# Patient Record
Sex: FEMALE | Race: WHITE | NOT HISPANIC OR LATINO | Employment: PART TIME | URBAN - METROPOLITAN AREA
[De-identification: names, ages, dates, MRNs, and addresses within clinical notes are randomized per-mention and may not be internally consistent; named-entity substitution may affect disease eponyms.]

---

## 2017-04-12 ENCOUNTER — GENERIC CONVERSION - ENCOUNTER (OUTPATIENT)
Dept: OTHER | Facility: OTHER | Age: 32
End: 2017-04-12

## 2019-09-06 ENCOUNTER — OFFICE VISIT (OUTPATIENT)
Dept: URGENT CARE | Facility: MEDICAL CENTER | Age: 34
End: 2019-09-06
Payer: COMMERCIAL

## 2019-09-06 VITALS
TEMPERATURE: 97 F | DIASTOLIC BLOOD PRESSURE: 80 MMHG | SYSTOLIC BLOOD PRESSURE: 108 MMHG | RESPIRATION RATE: 18 BRPM | WEIGHT: 119.05 LBS | BODY MASS INDEX: 20.32 KG/M2 | OXYGEN SATURATION: 100 % | HEIGHT: 64 IN | HEART RATE: 67 BPM

## 2019-09-06 DIAGNOSIS — J30.9 ALLERGIC RHINITIS, UNSPECIFIED SEASONALITY, UNSPECIFIED TRIGGER: Primary | ICD-10-CM

## 2019-09-06 PROCEDURE — G0381 LEV 2 HOSP TYPE B ED VISIT: HCPCS | Performed by: PHYSICIAN ASSISTANT

## 2019-09-06 RX ORDER — PREDNISONE 10 MG/1
TABLET ORAL
Qty: 18 TABLET | Refills: 0 | Status: SHIPPED | OUTPATIENT
Start: 2019-09-06 | End: 2020-02-24 | Stop reason: ALTCHOICE

## 2019-09-06 NOTE — PROGRESS NOTES
[facility]        NAME: Marsha Coles is a 29 y o  female  : 1985    MRN: [id number]  DATE: 2019  TIME: 2:34 PM    Assessment and Plan   Allergic rhinitis, unspecified seasonality, unspecified trigger [J30 9]  1  Allergic rhinitis, unspecified seasonality, unspecified trigger  predniSONE 10 mg tablet         Patient Instructions       Follow up with PCP in 3-5 days  Proceed to  ER if symptoms worsen  Chief Complaint     Chief Complaint   Patient presents with    Cold Like Symptoms     c/o three week hx sinus pain, cough, nasal congestion, achiness         History of Present Illness       URI    This is a new problem  The current episode started 1 to 4 weeks ago  The problem has been gradually worsening  There has been no fever  Associated symptoms include congestion, coughing ( nonproductive), a plugged ear sensation, rhinorrhea and sinus pain  Pertinent negatives include no abdominal pain, chest pain, diarrhea, dysuria, ear pain, headaches, joint pain, joint swelling, nausea, neck pain, rash, sneezing, sore throat, swollen glands, vomiting or wheezing  She has tried decongestant for the symptoms  The treatment provided no relief  Review of Systems   Review of Systems   HENT: Positive for congestion, rhinorrhea and sinus pain  Negative for ear pain, sneezing and sore throat  Respiratory: Positive for cough ( nonproductive)  Negative for wheezing  Cardiovascular: Negative for chest pain  Gastrointestinal: Negative for abdominal pain, diarrhea, nausea and vomiting  Genitourinary: Negative for dysuria  Musculoskeletal: Negative for joint pain and neck pain  Skin: Negative for rash  Neurological: Negative for headaches  All other systems reviewed and are negative          Current Medications       Current Outpatient Medications:     predniSONE 10 mg tablet, 4 x 3 days, 3 x 1, 2 x 1, 1 x 1, Disp: 18 tablet, Rfl: 0    Current Allergies     Allergies as of 09/06/2019 - Reviewed 09/06/2019   Allergen Reaction Noted    Codeine Anaphylaxis 09/06/2019            The following portions of the patient's history were reviewed and updated as appropriate: allergies, current medications, past family history, past medical history, past social history, past surgical history and problem list      Past Medical History:   Diagnosis Date    Known health problems: none        Past Surgical History:   Procedure Laterality Date    ELBOW SURGERY      FINGER SURGERY         History reviewed  No pertinent family history  Medications have been verified  Objective   /80   Pulse 67   Temp (!) 97 °F (36 1 °C) (Tympanic)   Resp 18   Ht 5' 4" (1 626 m)   Wt 54 kg (119 lb 0 8 oz)   SpO2 100%   BMI 20 43 kg/m²        Physical Exam     Physical Exam   Constitutional: She appears well-developed and well-nourished  HENT:   Right Ear: Hearing, external ear and ear canal normal  Tympanic membrane is bulging  Left Ear: Hearing, external ear and ear canal normal  Tympanic membrane is bulging  Nose: Mucosal edema and rhinorrhea present  Mouth/Throat: Posterior oropharyngeal erythema present  Cardiovascular: Normal rate, regular rhythm and normal heart sounds  Pulmonary/Chest: Effort normal and breath sounds normal    Nursing note and vitals reviewed

## 2020-02-24 ENCOUNTER — OFFICE VISIT (OUTPATIENT)
Dept: FAMILY MEDICINE CLINIC | Facility: CLINIC | Age: 35
End: 2020-02-24
Payer: COMMERCIAL

## 2020-02-24 VITALS
BODY MASS INDEX: 20.86 KG/M2 | WEIGHT: 122.2 LBS | SYSTOLIC BLOOD PRESSURE: 118 MMHG | DIASTOLIC BLOOD PRESSURE: 78 MMHG | RESPIRATION RATE: 18 BRPM | OXYGEN SATURATION: 98 % | HEIGHT: 64 IN | TEMPERATURE: 97.9 F | HEART RATE: 90 BPM

## 2020-02-24 DIAGNOSIS — F32.A ANXIETY AND DEPRESSION: Primary | ICD-10-CM

## 2020-02-24 DIAGNOSIS — Z72.0 TOBACCO ABUSE: ICD-10-CM

## 2020-02-24 DIAGNOSIS — F41.9 ANXIETY AND DEPRESSION: Primary | ICD-10-CM

## 2020-02-24 PROCEDURE — 3008F BODY MASS INDEX DOCD: CPT | Performed by: INTERNAL MEDICINE

## 2020-02-24 PROCEDURE — 99203 OFFICE O/P NEW LOW 30 MIN: CPT | Performed by: INTERNAL MEDICINE

## 2020-02-24 RX ORDER — ESCITALOPRAM OXALATE 5 MG/1
5 TABLET ORAL DAILY
Qty: 30 TABLET | Refills: 2 | Status: SHIPPED | OUTPATIENT
Start: 2020-02-24 | End: 2020-04-15 | Stop reason: ALTCHOICE

## 2020-02-24 RX ORDER — HYDROXYZINE HYDROCHLORIDE 25 MG/1
25 TABLET, FILM COATED ORAL EVERY 8 HOURS PRN
COMMUNITY
Start: 2020-02-07 | End: 2020-05-19 | Stop reason: ALTCHOICE

## 2020-02-24 NOTE — PROGRESS NOTES
Assessment/Plan:     1  Anxiety and depression  Assessment & Plan:  Discussed in detail  I have given her a referral to a therapist and also will start Lexapro 5 mg daily  Discussed to watch for SE and if she notes any or has to stop the medication to let us know  She will follow up in 2 months  Orders:  -     TSH, 3rd generation with Free T4 reflex  -     escitalopram (LEXAPRO) 5 mg tablet; Take 1 tablet (5 mg total) by mouth daily    2  Tobacco abuse  Assessment & Plan:  Discussed and has been cutting down  Encouraged to quit smoking  Subjective:      Patient ID: Juhi Raya is a 29 y o  female  Bautista Salgado is here today to establish care  Chart was reviewed and updated  Reports largely feeling okay but has been noticing increased anxiety over the last year or so  She reports having a panic attack and was seen in the ED as well for this  She was given hydroxyzine and felt lightheaded from it  She does not like to take medications in general but feels that she needs something to try and help  The following portions of the patient's history were reviewed and updated as appropriate: She  has a past medical history of Known health problems: none  She   Patient Active Problem List    Diagnosis Date Noted    Tobacco abuse 02/24/2020    Anxiety and depression 02/24/2020     She  has a past surgical history that includes Elbow surgery and Finger surgery  Her family history is not on file  She  reports that she has been smoking cigarettes  She has been smoking about 0 50 packs per day  She has never used smokeless tobacco  She reports that she drinks about 14 0 standard drinks of alcohol per week  She reports that she does not use drugs    Current Outpatient Medications   Medication Sig Dispense Refill    hydrOXYzine HCL (ATARAX) 25 mg tablet Take 25 mg by mouth every 8 (eight) hours as needed      escitalopram (LEXAPRO) 5 mg tablet Take 1 tablet (5 mg total) by mouth daily 30 tablet 2 No current facility-administered medications for this visit  She is allergic to codeine       Review of Systems   Constitutional: Positive for appetite change  Negative for chills, fatigue and fever  Respiratory: Negative for cough, shortness of breath and wheezing  Cardiovascular: Negative for chest pain, palpitations and leg swelling  Gastrointestinal: Negative for abdominal pain, nausea and vomiting  Neurological: Negative for light-headedness and headaches  Psychiatric/Behavioral: Positive for dysphoric mood and sleep disturbance  The patient is nervous/anxious  Objective:      /78   Pulse 90   Temp 97 9 °F (36 6 °C)   Resp 18   Ht 5' 4" (1 626 m)   Wt 55 4 kg (122 lb 3 2 oz)   SpO2 98%   BMI 20 98 kg/m²          Physical Exam   Constitutional: She is oriented to person, place, and time  She appears well-developed and well-nourished  No distress  HENT:   Head: Normocephalic and atraumatic  Mouth/Throat: Oropharynx is clear and moist    Eyes: Pupils are equal, round, and reactive to light  Conjunctivae and EOM are normal  Right eye exhibits no discharge  Left eye exhibits no discharge  Neck: Normal range of motion  Neck supple  No thyromegaly present  Cardiovascular: Normal rate, regular rhythm and normal heart sounds  Pulmonary/Chest: Effort normal and breath sounds normal  No respiratory distress  She has no wheezes  Abdominal: Soft  Bowel sounds are normal  There is no tenderness  Musculoskeletal: Normal range of motion  She exhibits no edema  Lymphadenopathy:     She has no cervical adenopathy  Neurological: She is alert and oriented to person, place, and time  Skin: Skin is warm and dry  She is not diaphoretic  Psychiatric: She has a normal mood and affect  Her speech is normal and behavior is normal  Judgment and thought content normal    Vitals reviewed

## 2020-02-24 NOTE — ASSESSMENT & PLAN NOTE
Discussed in detail  I have given her a referral to a therapist and also will start Lexapro 5 mg daily  Discussed to watch for SE and if she notes any or has to stop the medication to let us know  She will follow up in 2 months

## 2020-03-23 ENCOUNTER — TELEPHONE (OUTPATIENT)
Dept: ADMINISTRATIVE | Facility: OTHER | Age: 35
End: 2020-03-23

## 2020-03-23 NOTE — TELEPHONE ENCOUNTER
Upon review of the In Basket request we were able to locate, review, and update the patient chart as requested for Pap Smear (HPV) aka Cervical Cancer Screening  Any additional questions or concerns should be emailed to the Practice Liaisons via [email or web address] email, please do not reply via In Basket      Thank you  Jennifer Wisdom

## 2020-03-23 NOTE — TELEPHONE ENCOUNTER
----- Message from Jesus Muhammad sent at 3/23/2020  9:54 AM EDT -----  Regarding: PAP exam/ [facility]  03/23/20 9:54 AM    Hello, our patient Modesta Moya has had Pap Smear (HPV) aka Cervical Cancer Screening completed/performed  Please assist in updating the patient chart by pulling the Care Everywhere (CE) document  The date of service is 1/10/2018       Thank you,  Miya Patino MA  [facility]

## 2020-04-15 ENCOUNTER — OFFICE VISIT (OUTPATIENT)
Dept: FAMILY MEDICINE CLINIC | Facility: CLINIC | Age: 35
End: 2020-04-15
Payer: COMMERCIAL

## 2020-04-15 VITALS
OXYGEN SATURATION: 98 % | HEART RATE: 90 BPM | RESPIRATION RATE: 18 BRPM | SYSTOLIC BLOOD PRESSURE: 120 MMHG | HEIGHT: 64 IN | BODY MASS INDEX: 20.45 KG/M2 | WEIGHT: 119.8 LBS | TEMPERATURE: 98.2 F | DIASTOLIC BLOOD PRESSURE: 82 MMHG

## 2020-04-15 DIAGNOSIS — F32.A ANXIETY AND DEPRESSION: Primary | ICD-10-CM

## 2020-04-15 DIAGNOSIS — F41.9 ANXIETY AND DEPRESSION: Primary | ICD-10-CM

## 2020-04-15 PROCEDURE — 3008F BODY MASS INDEX DOCD: CPT | Performed by: INTERNAL MEDICINE

## 2020-04-15 PROCEDURE — 99213 OFFICE O/P EST LOW 20 MIN: CPT | Performed by: INTERNAL MEDICINE

## 2020-04-15 RX ORDER — BUSPIRONE HYDROCHLORIDE 5 MG/1
5 TABLET ORAL 2 TIMES DAILY
Qty: 60 TABLET | Refills: 5 | Status: SHIPPED | OUTPATIENT
Start: 2020-04-15 | End: 2020-05-19 | Stop reason: ALTCHOICE

## 2020-04-15 RX ORDER — HYDROXYZINE HYDROCHLORIDE 10 MG/1
10 TABLET, FILM COATED ORAL EVERY 6 HOURS PRN
Qty: 30 TABLET | Refills: 0 | Status: SHIPPED | OUTPATIENT
Start: 2020-04-15 | End: 2020-11-17 | Stop reason: ALTCHOICE

## 2020-05-19 ENCOUNTER — OFFICE VISIT (OUTPATIENT)
Dept: FAMILY MEDICINE CLINIC | Facility: CLINIC | Age: 35
End: 2020-05-19
Payer: COMMERCIAL

## 2020-05-19 VITALS
DIASTOLIC BLOOD PRESSURE: 84 MMHG | HEIGHT: 64 IN | BODY MASS INDEX: 20.51 KG/M2 | SYSTOLIC BLOOD PRESSURE: 122 MMHG | TEMPERATURE: 97.8 F | WEIGHT: 120.1 LBS | HEART RATE: 100 BPM | OXYGEN SATURATION: 98 % | RESPIRATION RATE: 18 BRPM

## 2020-05-19 DIAGNOSIS — F41.9 ANXIETY AND DEPRESSION: Primary | ICD-10-CM

## 2020-05-19 DIAGNOSIS — G56.22 CUBITAL TUNNEL SYNDROME ON LEFT: ICD-10-CM

## 2020-05-19 DIAGNOSIS — F32.A ANXIETY AND DEPRESSION: Primary | ICD-10-CM

## 2020-05-19 PROCEDURE — 3008F BODY MASS INDEX DOCD: CPT | Performed by: INTERNAL MEDICINE

## 2020-05-19 PROCEDURE — 99213 OFFICE O/P EST LOW 20 MIN: CPT | Performed by: INTERNAL MEDICINE

## 2020-11-17 ENCOUNTER — TELEMEDICINE (OUTPATIENT)
Dept: FAMILY MEDICINE CLINIC | Facility: CLINIC | Age: 35
End: 2020-11-17
Payer: COMMERCIAL

## 2020-11-17 DIAGNOSIS — Z20.822 EXPOSURE TO COVID-19 VIRUS: Primary | ICD-10-CM

## 2020-11-17 PROCEDURE — 99213 OFFICE O/P EST LOW 20 MIN: CPT | Performed by: INTERNAL MEDICINE

## 2020-11-23 DIAGNOSIS — B34.9 VIRAL INFECTION, UNSPECIFIED: ICD-10-CM

## 2020-11-23 DIAGNOSIS — Z20.822 EXPOSURE TO COVID-19 VIRUS: ICD-10-CM

## 2020-11-23 PROCEDURE — 87637 SARSCOV2&INF A&B&RSV AMP PRB: CPT | Performed by: INTERNAL MEDICINE

## 2020-11-25 LAB
FLUAV RNA NPH QL NAA+PROBE: NOT DETECTED
FLUBV RNA NPH QL NAA+PROBE: NOT DETECTED
RSV RNA NPH QL NAA+PROBE: NOT DETECTED
SARS-COV-2 RNA NPH QL NAA+PROBE: NOT DETECTED

## 2021-01-04 ENCOUNTER — OFFICE VISIT (OUTPATIENT)
Dept: FAMILY MEDICINE CLINIC | Facility: CLINIC | Age: 36
End: 2021-01-04
Payer: COMMERCIAL

## 2021-01-04 VITALS
OXYGEN SATURATION: 99 % | HEIGHT: 64 IN | BODY MASS INDEX: 20.73 KG/M2 | SYSTOLIC BLOOD PRESSURE: 122 MMHG | WEIGHT: 121.4 LBS | TEMPERATURE: 97.8 F | HEART RATE: 81 BPM | DIASTOLIC BLOOD PRESSURE: 78 MMHG

## 2021-01-04 DIAGNOSIS — G89.29 CHRONIC BILATERAL LOW BACK PAIN WITHOUT SCIATICA: ICD-10-CM

## 2021-01-04 DIAGNOSIS — F41.9 ANXIETY AND DEPRESSION: Primary | ICD-10-CM

## 2021-01-04 DIAGNOSIS — M79.602 LEFT ARM PAIN: ICD-10-CM

## 2021-01-04 DIAGNOSIS — F32.A ANXIETY AND DEPRESSION: Primary | ICD-10-CM

## 2021-01-04 DIAGNOSIS — Z72.0 TOBACCO ABUSE: ICD-10-CM

## 2021-01-04 DIAGNOSIS — M25.522 LEFT ELBOW PAIN: ICD-10-CM

## 2021-01-04 DIAGNOSIS — M54.50 CHRONIC BILATERAL LOW BACK PAIN WITHOUT SCIATICA: ICD-10-CM

## 2021-01-04 PROCEDURE — 99214 OFFICE O/P EST MOD 30 MIN: CPT | Performed by: PHYSICIAN ASSISTANT

## 2021-01-04 RX ORDER — PAROXETINE 10 MG/1
10 TABLET, FILM COATED ORAL DAILY
Qty: 30 TABLET | Refills: 1 | Status: SHIPPED | OUTPATIENT
Start: 2021-01-04

## 2021-01-04 NOTE — PROGRESS NOTES
FAMILY PRACTICE OFFICE VISIT  [facility]       NAME: Edgardo Sousa  AGE: 28 y o  SEX: female       : 1985        MRN: [id number]    DATE: 2021  TIME: 1:17 AM    Assessment and Plan     Problem List Items Addressed This Visit        Other    Tobacco abuse     Encouraged to quit smoking but is not currently ready  States that she will likely quit when she has her next pregnancy  Encouraged to reach out if she desires any assistance  Tobacco Cessation Counseling: Tobacco cessation counseling and education was provided  The patient is sincerely urged to quit consumption of tobacco  She is not ready to quit tobacco  The numerous health risks of tobacco consumption were discussed  If she decides to quit, there are a number of helpful adjunctive aids, and she can see me to discuss nicotine replacement therapy, chantix, or bupropion anytime in the future  Anxiety and depression - Primary     Patient is interested in taking a daily medication anxiety and depression  She was started Paxil 10 daily  She can continue with hydroxyzine occasionally as needed  Follow-up in 1 month  Call if problems or concerns in the interim  Depression Screening Follow-up Plan: Patient's depression screening was positive with a PHQ-2 score of 2  Their PHQ-9 score was 7  Patient assessed for underlying major depression  They have no active suicidal ideations  Brief counseling provided and recommend additional follow-up/re-evaluation next office visit  Relevant Medications    PARoxetine (PAXIL) 10 mg tablet    Left elbow pain     With history of fracture about 10 years ago  Check x-ray of left elbow since expresses point tenderness just proximal to the olecranon process  Relevant Orders    XR elbow 3+ vw left    Left arm pain     Possibly radicular in nature  Check x-ray of cervical spine and left elbow    Consider EMG if noncontributory  Reassess at next visit  Relevant Orders    XR spine cervical complete 4 or 5 vw non injury    Chronic bilateral low back pain without sciatica     Reviewed that today's exam suggests muscular source of her symptoms  She was given referral for physical therapy  Encouraged continue trying heating pad as needed since that gives her the most relief  Reassess at next visit  Relevant Orders    Ambulatory referral to Physical Therapy          Anxiety and depression  Patient is interested in taking a daily medication anxiety and depression  She was started Paxil 10 daily  She can continue with hydroxyzine occasionally as needed  Follow-up in 1 month  Call if problems or concerns in the interim  Depression Screening Follow-up Plan: Patient's depression screening was positive with a PHQ-2 score of 2  Their PHQ-9 score was 7  Patient assessed for underlying major depression  They have no active suicidal ideations  Brief counseling provided and recommend additional follow-up/re-evaluation next office visit  Chronic bilateral low back pain without sciatica  Reviewed that today's exam suggests muscular source of her symptoms  She was given referral for physical therapy  Encouraged continue trying heating pad as needed since that gives her the most relief  Reassess at next visit  Left arm pain  Possibly radicular in nature  Check x-ray of cervical spine and left elbow  Consider EMG if noncontributory  Reassess at next visit  Left elbow pain  With history of fracture about 10 years ago  Check x-ray of left elbow since expresses point tenderness just proximal to the olecranon process  Tobacco abuse  Encouraged to quit smoking but is not currently ready  States that she will likely quit when she has her next pregnancy  Encouraged to reach out if she desires any assistance  Tobacco Cessation Counseling: Tobacco cessation counseling and education was provided   The patient is sincerely urged to quit consumption of tobacco  She is not ready to quit tobacco  The numerous health risks of tobacco consumption were discussed  If she decides to quit, there are a number of helpful adjunctive aids, and she can see me to discuss nicotine replacement therapy, chantix, or bupropion anytime in the future  Chief Complaint     Chief Complaint   Patient presents with    Follow-up     patient states she has been in alot of pain left arm, back  she states her left hand goes numb  History of Present Illness   Abdias Lucia is a 28y o -year-old female who presents for anxiety and hand numbness  Patient notes that she has chronic pain in the left shoulder, elbow, forearm and hand for the last few months  She broke the left elbow 10 years ago  She denies any visual swelling or color changes  She notes stabbing in the elbow and up the arm  She has burning in the forearm and left hand goes numb when it is most severe  She denies any current treatments or previous tests  She denies a change in the pain with Tylenol  She notes that she has ongoing pain in the lower back for months  She tries to work on posture but then causes shooting in her back  She notes sleeping on stomach makes it severe  She gets better relief with heating pad than Tylenol  She has occasional pain in left calf as well  She denies numbness or weakness  She has a weighted feeling in the left leg when severe  She denies incontinence  She notes that she had a terrible anxiety  She notes that she had trouble breathing while just making sandwich for her son  She notes that hydroxyzine made her dizzy  She felt like a zombie and was lethargic  Review of Systems   Review of Systems   Genitourinary:        No incontinence   Musculoskeletal: Positive for back pain  Negative for neck pain  Left arm pain   Neurological: Positive for numbness (in left hand )  Negative for weakness  Psychiatric/Behavioral: Positive for dysphoric mood  The patient is nervous/anxious  Active Problem List     Patient Active Problem List   Diagnosis    Tobacco abuse    Anxiety and depression    Left elbow pain    Left arm pain    Chronic bilateral low back pain without sciatica         Past Medical History:  Past Medical History:   Diagnosis Date    Bulimia nervosa     per [employer]    Right clavicle fracture        Past Surgical History:  Past Surgical History:   Procedure Laterality Date    ELBOW SURGERY      FINGER SURGERY Right     4th finger       Family History:  History reviewed  No pertinent family history  Social History:  Social History     Socioeconomic History    Marital status: /Civil Union     Spouse name: Not on file    Number of children: Not on file    Years of education: Not on file    Highest education level: Not on file   Occupational History    Not on file   Social Needs    Financial resource strain: Not on file    Food insecurity     Worry: Not on file     Inability: Not on file   [employer] needs     Medical: Not on file     Non-medical: Not on file   Tobacco Use    Smoking status: Current Every Day Smoker     Packs/day: 0 50     Types: Cigarettes    Smokeless tobacco: Never Used   Substance and Sexual Activity    Alcohol use:  Yes     Alcohol/week: 14 0 standard drinks     Types: 14 Glasses of wine per week     Frequency: 4 or more times a week     Drinks per session: 1 or 2     Binge frequency: Never    Drug use: Never    Sexual activity: Not on file   Lifestyle    Physical activity     Days per week: Not on file     Minutes per session: Not on file    Stress: Not on file   Relationships    Social connections     Talks on phone: Not on file     Gets together: Not on file     Attends [employer]ism service: Not on file     Active member of club or organization: Not on file     Attends meetings of clubs or organizations: Not on file     Relationship status: Not on file    Intimate partner violence     Fear of current or ex partner: Not on file     Emotionally abused: Not on file     Physically abused: Not on file     Forced sexual activity: Not on file   Other Topics Concern    Not on file   Social History Narrative    Not on file       Objective     Vitals:    01/04/21 1326   BP: 122/78   BP Location: Right arm   Patient Position: Sitting   Cuff Size: Adult   Pulse: 81   Temp: 97 8 °F (36 6 °C)   TempSrc: Temporal   SpO2: 99%   Weight: 55 1 kg (121 lb 6 4 oz)   Height: 5' 4" (1 626 m)     Wt Readings from Last 3 Encounters:   01/04/21 55 1 kg (121 lb 6 4 oz)   05/19/20 54 5 kg (120 lb 1 6 oz)   04/15/20 54 3 kg (119 lb 12 8 oz)       Physical Exam  Vitals signs reviewed  Constitutional:       General: She is not in acute distress  Appearance: Normal appearance  She is normal weight  She is not ill-appearing  Neck:      Musculoskeletal: Normal range of motion and neck supple  No neck rigidity or muscular tenderness  Cardiovascular:      Rate and Rhythm: Normal rate and regular rhythm  Pulses: Normal pulses  Heart sounds: Normal heart sounds  No murmur  Pulmonary:      Effort: Pulmonary effort is normal       Breath sounds: Normal breath sounds  No wheezing, rhonchi or rales  Musculoskeletal:         General: Tenderness (in left arm especially lateral arm and proximal to the olecranon process, also TTP over the lower lumbar spine and b/l paraspinal lumbar areas) present  No swelling or deformity  Right lower leg: No edema  Left lower leg: No edema  Comments: FROM in lumbar spine but pain with forward flexion across lumbar area b/l   Skin:     General: Skin is warm and dry  Neurological:      Mental Status: She is alert  Sensory: Sensation is intact  Deep Tendon Reflexes:      Reflex Scores:       Patellar reflexes are 3+ on the right side and 3+ on the left side       Comments: Negative seated SLR bilaterally, Equal and intact light touch in UE and LE bilaterally   Psychiatric:         Mood and Affect: Mood normal          Behavior: Behavior normal          Pertinent Laboratory/Diagnostic Studies:    Results for orders placed or performed in visit on 11/23/20   Novel Coronavirus 2019(COVID-19), Influenza A/B, RSV [facility] Collected at [facility] or [facility]    Specimen: Nose; Nares   Result Value Ref Range    SARS-COV-2 Not Detected Not Detected    INFLUENZA A PCR Not Detected Not Detected    INFLUENZA B PCR Not Detected Not Detected    RSV PCR Not Detected Not Detected         ALLERGIES:  Allergies   Allergen Reactions    Codeine Anaphylaxis       Current Medications     Current Outpatient Medications   Medication Sig Dispense Refill    PARoxetine (PAXIL) 10 mg tablet Take 1 tablet (10 mg total) by mouth daily 30 tablet 1     No current facility-administered medications for this visit            Health Maintenance     Health Maintenance   Topic Date Due    Pneumococcal Vaccine: Pediatrics (0 to 5 Years) and At-Risk Patients (6 to 59 Years) (1 of 1 - PPSV23) 06/27/1991    HIV Screening  06/27/2000    Annual Physical  06/27/2003    Influenza Vaccine (1) 06/30/2021 (Originally 9/1/2020)    BMI: Adult  01/04/2022    Depression Remission PHQ  01/04/2022    Cervical Cancer Screening  01/10/2023    DTaP,Tdap,and Td Vaccines (7 - Td) 07/28/2028    HIB Vaccine  Completed    Hepatitis B Vaccine  Completed    IPV Vaccine  Completed    Hepatitis A Vaccine  Aged Out    Meningococcal ACWY Vaccine  Aged Out    HPV Vaccine  Aged Out     Immunization History   Administered Date(s) Administered    DTP [birth year], 01/06/1986, 10/06/1987, 06/19/1990, 06/20/1990    Hep B, adult 08/15/1995, 09/19/1995, 03/11/1996, 01/10/2018, 03/22/2018, 07/24/2018    HiB 08/13/1987    IPV 03/18/1986, 05/06/1986, 01/06/1987, 06/20/1990    MMR 10/07/1986, 07/31/1991    OPV 01/06/1986, 03/18/1986, 05/06/1986, 06/20/1990    Tdap 07/28/2018       John Rodriguez PA-C  1/5/2021 1:17 AM  [facility]

## 2021-01-05 PROBLEM — M25.522 LEFT ELBOW PAIN: Status: ACTIVE | Noted: 2021-01-05

## 2021-01-05 PROBLEM — M79.602 LEFT ARM PAIN: Status: ACTIVE | Noted: 2021-01-05

## 2021-01-05 PROBLEM — M54.50 CHRONIC BILATERAL LOW BACK PAIN WITHOUT SCIATICA: Status: ACTIVE | Noted: 2021-01-05

## 2021-01-05 PROBLEM — G89.29 CHRONIC BILATERAL LOW BACK PAIN WITHOUT SCIATICA: Status: ACTIVE | Noted: 2021-01-05

## 2021-01-05 NOTE — ASSESSMENT & PLAN NOTE
Patient is interested in taking a daily medication anxiety and depression  She was started Paxil 10 daily  She can continue with hydroxyzine occasionally as needed  Follow-up in 1 month  Call if problems or concerns in the interim  Depression Screening Follow-up Plan: Patient's depression screening was positive with a PHQ-2 score of 2  Their PHQ-9 score was 7  Patient assessed for underlying major depression  They have no active suicidal ideations  Brief counseling provided and recommend additional follow-up/re-evaluation next office visit

## 2021-01-05 NOTE — ASSESSMENT & PLAN NOTE
Possibly radicular in nature  Check x-ray of cervical spine and left elbow  Consider EMG if noncontributory  Reassess at next visit

## 2021-01-05 NOTE — ASSESSMENT & PLAN NOTE
Reviewed that today's exam suggests muscular source of her symptoms  She was given referral for physical therapy  Encouraged continue trying heating pad as needed since that gives her the most relief  Reassess at next visit

## 2021-01-05 NOTE — ASSESSMENT & PLAN NOTE
Encouraged to quit smoking but is not currently ready  States that she will likely quit when she has her next pregnancy  Encouraged to reach out if she desires any assistance  Tobacco Cessation Counseling: Tobacco cessation counseling and education was provided  The patient is sincerely urged to quit consumption of tobacco  She is not ready to quit tobacco  The numerous health risks of tobacco consumption were discussed  If she decides to quit, there are a number of helpful adjunctive aids, and she can see me to discuss nicotine replacement therapy, chantix, or bupropion anytime in the future

## 2021-01-05 NOTE — ASSESSMENT & PLAN NOTE
With history of fracture about 10 years ago  Check x-ray of left elbow since expresses point tenderness just proximal to the olecranon process

## 2021-01-11 ENCOUNTER — APPOINTMENT (OUTPATIENT)
Dept: RADIOLOGY | Facility: MEDICAL CENTER | Age: 36
End: 2021-01-11
Payer: COMMERCIAL

## 2021-01-11 DIAGNOSIS — M79.602 LEFT ARM PAIN: ICD-10-CM

## 2021-01-11 DIAGNOSIS — M25.522 LEFT ELBOW PAIN: ICD-10-CM

## 2021-01-11 PROCEDURE — 73080 X-RAY EXAM OF ELBOW: CPT

## 2021-01-11 PROCEDURE — 72050 X-RAY EXAM NECK SPINE 4/5VWS: CPT

## 2021-01-15 DIAGNOSIS — M79.602 LEFT ARM PAIN: Primary | ICD-10-CM

## 2021-08-10 RX ORDER — DOXYCYCLINE HYCLATE 100 MG
100 TABLET ORAL 2 TIMES DAILY
COMMUNITY
Start: 2021-08-05 | End: 2021-08-26

## 2021-08-11 ENCOUNTER — OFFICE VISIT (OUTPATIENT)
Dept: FAMILY MEDICINE CLINIC | Facility: CLINIC | Age: 36
End: 2021-08-11
Payer: COMMERCIAL

## 2021-08-11 VITALS
OXYGEN SATURATION: 99 % | WEIGHT: 118.6 LBS | HEART RATE: 75 BPM | RESPIRATION RATE: 12 BRPM | BODY MASS INDEX: 20.25 KG/M2 | DIASTOLIC BLOOD PRESSURE: 80 MMHG | SYSTOLIC BLOOD PRESSURE: 100 MMHG | HEIGHT: 64 IN

## 2021-08-11 DIAGNOSIS — A69.20 LYME DISEASE: Primary | ICD-10-CM

## 2021-08-11 PROBLEM — F33.9 DEPRESSION, RECURRENT (HCC): Status: ACTIVE | Noted: 2021-08-11

## 2021-08-11 PROCEDURE — 99212 OFFICE O/P EST SF 10 MIN: CPT | Performed by: INTERNAL MEDICINE

## 2021-08-11 NOTE — PATIENT INSTRUCTIONS
Please eat yogurt twice a day, or better if he will be able to get Florastor  finish 21 days of doxycycline 1 pill twice a day

## 2021-08-11 NOTE — PROGRESS NOTES
Assessment/Plan:    No problem-specific Assessment & Plan notes found for this encounter  Diagnoses and all orders for this visit:    Lyme disease  -     Ambulatory referral to Infectious Disease; Future    Other orders  -     doxycycline hyclate (VIBRA-TABS) 100 mg tablet; Take 100 mg by mouth 2 (two) times a day      continue doxycycline 100 mg b i d  for total 21 days, will consult Infectious Disease for concern of nervous system Lyme  Subjective:      Patient ID: Radha Browning is a 39 y o  female  Patient was recently tested for Lyme disease and her test appeared to be positive  She is complaining of polyarthralgia, headache numbness in her left upper extremity that she has for about a year  She also reports palpitations on and off  Her EKG was done recently which showed incomplete right bundle branch block, she had multiple visits to emergency room because of the palpitations  She was recently started on doxycycline 100 mg twice a day for 21 days  The following portions of the patient's history were reviewed and updated as appropriate: allergies, current medications, past family history, past medical history, past social history, past surgical history, and problem list     Review of Systems   Constitutional: Negative for activity change, appetite change, chills, fatigue and fever  HENT: Negative for congestion, ear pain, rhinorrhea and sore throat  Respiratory: Negative for cough, shortness of breath and wheezing  Cardiovascular: Positive for palpitations  Negative for chest pain and leg swelling  Gastrointestinal: Negative for abdominal distention, abdominal pain, diarrhea, nausea and vomiting  Genitourinary: Negative for difficulty urinating, frequency and pelvic pain  Musculoskeletal: Positive for arthralgias  Negative for back pain and neck pain  Skin: Negative for rash  Neurological: Positive for numbness and headaches   Negative for dizziness, tremors and weakness  Objective:      /80 (BP Location: Left arm, Patient Position: Sitting, Cuff Size: Standard)   Pulse 75   Resp 12   Ht 5' 4" (1 626 m)   Wt 53 8 kg (118 lb 9 6 oz)   SpO2 99%   BMI 20 36 kg/m²          Physical Exam  Constitutional:       General: She is not in acute distress  Appearance: Normal appearance  She is not ill-appearing  HENT:      Nose: No rhinorrhea  Cardiovascular:      Rate and Rhythm: Normal rate and regular rhythm  Heart sounds: No murmur heard  No friction rub  No gallop  Pulmonary:      Effort: No respiratory distress  Breath sounds: No wheezing or rhonchi  Chest:      Chest wall: No tenderness  Abdominal:      General: There is no distension  Palpations: There is no mass  Tenderness: There is no abdominal tenderness  There is no guarding or rebound  Hernia: No hernia is present  Musculoskeletal:         General: No swelling or tenderness  Lymphadenopathy:      Cervical: No cervical adenopathy  Skin:     Coloration: Skin is not jaundiced  Findings: No rash  Neurological:      Mental Status: She is alert and oriented to person, place, and time  Motor: No weakness        Gait: Gait normal    Psychiatric:         Mood and Affect: Mood normal          Behavior: Behavior normal                Current Outpatient Medications:     doxycycline hyclate (VIBRA-TABS) 100 mg tablet, Take 100 mg by mouth 2 (two) times a day, Disp: , Rfl:     PARoxetine (PAXIL) 10 mg tablet, Take 1 tablet (10 mg total) by mouth daily (Patient not taking: Reported on 8/11/2021), Disp: 30 tablet, Rfl: 1

## 2021-08-18 ENCOUNTER — TELEPHONE (OUTPATIENT)
Dept: INFECTIOUS DISEASES | Facility: CLINIC | Age: 36
End: 2021-08-18

## 2021-08-18 NOTE — TELEPHONE ENCOUNTER
Patient calls for appt  Patient's doctor messaged Dr Karyn Arce regarding this so that we can get her in  Dx: Lyme    It's been about a year ongoing, she has been to the ED and has limb numbness, headaches, and elevated BP  Patient does not have fever  Patient is currently on doxy for this  CB: [id number]

## 2021-09-01 ENCOUNTER — OFFICE VISIT (OUTPATIENT)
Dept: FAMILY MEDICINE CLINIC | Facility: CLINIC | Age: 36
End: 2021-09-01
Payer: COMMERCIAL

## 2021-09-01 VITALS
WEIGHT: 121.2 LBS | HEART RATE: 88 BPM | SYSTOLIC BLOOD PRESSURE: 118 MMHG | OXYGEN SATURATION: 98 % | TEMPERATURE: 97.6 F | BODY MASS INDEX: 20.69 KG/M2 | HEIGHT: 64 IN | DIASTOLIC BLOOD PRESSURE: 80 MMHG

## 2021-09-01 DIAGNOSIS — R42 DIZZINESS: ICD-10-CM

## 2021-09-01 DIAGNOSIS — A69.20 LYME DISEASE: ICD-10-CM

## 2021-09-01 DIAGNOSIS — R53.83 FATIGUE, UNSPECIFIED TYPE: ICD-10-CM

## 2021-09-01 DIAGNOSIS — F33.9 DEPRESSION, RECURRENT (HCC): ICD-10-CM

## 2021-09-01 DIAGNOSIS — R00.2 PALPITATIONS: Primary | ICD-10-CM

## 2021-09-01 DIAGNOSIS — R06.89 GASPING FOR BREATH: ICD-10-CM

## 2021-09-01 PROCEDURE — 93000 ELECTROCARDIOGRAM COMPLETE: CPT | Performed by: FAMILY MEDICINE

## 2021-09-01 PROCEDURE — 99214 OFFICE O/P EST MOD 30 MIN: CPT | Performed by: FAMILY MEDICINE

## 2021-09-01 PROCEDURE — 4004F PT TOBACCO SCREEN RCVD TLK: CPT | Performed by: FAMILY MEDICINE

## 2021-09-01 PROCEDURE — 3008F BODY MASS INDEX DOCD: CPT | Performed by: FAMILY MEDICINE

## 2021-09-01 NOTE — PATIENT INSTRUCTIONS
Stress echo  holter  Sleep study (must meet with sleep dr first)  Keep hydrated  Get plenty of rest  Healthy diet  Avoid over exertion  If symptoms at any point severe - proceed to ED  Schedule appointment with cardio but can try to get studies done prior

## 2021-09-01 NOTE — PROGRESS NOTES
FAMILY PRACTICE OFFICE VISIT    NAME: Janie Hallman    AGE: 39 y o  SEX: female  : 1985   MRN: [id number]    DATE: 2021  TIME: 1:18 PM    Assessment and Plan     1  Palpitations  Unknown etiology - suspect multifactorial  Pt with symptoms for 18 mos  Has not yet had cardiac workup  ecg today - NSR at 75 bpm; normal axis  t wave inversions in I and AVL   No comparison  No 1st degree av block with known lyme disease  No incomplete RBBB (this was reported on prior ecg report [facility] at [facility] - but no tracing available)    Due to t-wave abnormalities - along with pt's other symptoms - will send to cardio - referral made    Urge pt to keep hydrated  Limit etoh intake  Pt already avoiding caffeine  holter and stress echo  (recommend trying to have done prior to cardio eval)     Cannot r/o underlying anxiety as possible contributor  Pt used to take paxil    Last labs done 2021 - normal cbc and cmp  Normal electrolytes  Normal tsh  Will not repeat labs at this time  Denies chance of pregnancy      Also - cannot r/o underlying YULIANA given pt's symptoms  Will send for sleep study     - Holter monitor - 24 hour; Future  - Echo complete with contrast if indicated; Future  - POCT ECG    2  Depression, recurrent ([address] )      3  Lyme disease  Urge pt to complete full course of doxy  4   Gasping for breath  Send for sleep study    5   fatique  May be secondary to lyme dx  Vs poor sleep quality    6  Dizziness  Suspect secondary to above      Chief Complaint   No chief complaint on file  History of Present Illness   Janie Hallman is a 39y o -year-old female who presents today with complaints of ongoing palpitations - which are becoming more frequent and now occurring 2x/week  Has been to the dr and ED multiple times over the past year and a half      No h/o heart problems in the past     No known family h/o premature heart dz of sudden cardiac death    Noticing palpitations even at rest    No caffeine in over 18 mos  May get dizzy as well during palpitations    Denies illicit drug use  Occasionally drinks 2-3 beers and notices this may trigger symptoms the following day    Pt admits to keeping hydrated  No h/o seeing cardio  Recently dx with lyme disease  Taking doxy  Last labs done 4/2021 - normal cbc and cmp  Normal electrolytes  Normal tsh  Will not repeat labs at this time      Review of Systems   Review of Systems   Constitutional: Positive for fatigue  Negative for activity change, appetite change, chills, fever and unexpected weight change  During palpitations pt may feel very hot or cold  Has some more fatique than usual  Recently dx with lyme disease     Eyes: Negative for visual disturbance  Respiratory: Negative for cough, shortness of breath and wheezing  Symptoms may be suggestive of YULIANA  Wakes up gasping at times  Genitourinary:        Menses regular  Denies chance of pregnancy  Last menses 1-2 weeks ago   Neurological: Positive for dizziness  Dizzy during palpitations  Psychiatric/Behavioral:        Not currently taking anything for anxiety  Was taking paxil but no longer taking  Has not had to take short acting benzo for a while  Does not sleep well in general   Wakes in the night having some difficulty breathing - and feels that she is 'gasping' - about 2x/week  Active Problem List     Patient Active Problem List   Diagnosis    Tobacco abuse    Anxiety and depression    Left elbow pain    Left arm pain    Chronic bilateral low back pain without sciatica    Depression, recurrent ([address] )         Past Medical History:  Past Medical History:   Diagnosis Date    Bulimia nervosa     per [employer]scripts    Right clavicle fracture        Past Surgical History:  Past Surgical History:   Procedure Laterality Date    ELBOW SURGERY      FINGER SURGERY Right     4th finger       Family History:  History reviewed   No pertinent family history  Social History:  Social History     Socioeconomic History    Marital status: /Civil Union     Spouse name: Not on file    Number of children: Not on file    Years of education: Not on file    Highest education level: Not on file   Occupational History    Not on file   Tobacco Use    Smoking status: Current Every Day Smoker     Packs/day: 0 50     Types: Cigarettes    Smokeless tobacco: Never Used   Substance and Sexual Activity    Alcohol use: Yes     Alcohol/week: 14 0 standard drinks     Types: 14 Glasses of wine per week    Drug use: Never    Sexual activity: Not on file   Other Topics Concern    Not on file   Social History Narrative    Not on file     Social Determinants of Health     Financial Resource Strain:     Difficulty of Paying Living Expenses:    Food Insecurity:     Worried About Running Out of Food in the Last Year:     [address] in the Last Year:    Transportation Needs:     Lack of Transportation (Medical):  Lack of Transportation (Non-Medical):    Physical Activity:     Days of Exercise per Week:     Minutes of Exercise per Session:    Stress:     Feeling of Stress :    Social Connections:     Frequency of Communication with Friends and Family:     Frequency of Social Gatherings with Friends and Family:     Attends Shinto Services:     Active Member of Clubs or Organizations:     Attends Club or Organization Meetings:     Marital Status:    Intimate Partner Violence:     Fear of Current or Ex-Partner:     Emotionally Abused:     Physically Abused:     Sexually Abused:        Objective     Vitals:    09/01/21 1313   BP: 118/80   Pulse: 88   Temp: 97 6 °F (36 4 °C)   SpO2: 98%     Wt Readings from Last 3 Encounters:   09/01/21 55 kg (121 lb 3 2 oz)   08/11/21 53 8 kg (118 lb 9 6 oz)   01/04/21 55 1 kg (121 lb 6 4 oz)       Physical Exam  Vitals and nursing note reviewed  Constitutional:       General: She is not in acute distress  Appearance: Normal appearance  She is not ill-appearing or toxic-appearing  HENT:      Nose: Nose normal  No congestion  Mouth/Throat:      Mouth: Mucous membranes are moist       Pharynx: No oropharyngeal exudate or posterior oropharyngeal erythema  Eyes:      General: No scleral icterus  Neck:      Comments: No thyromegaly  Cardiovascular:      Rate and Rhythm: Normal rate and regular rhythm  Pulses: Normal pulses  Heart sounds: Normal heart sounds  No murmur heard  Comments: No ectopy  Pulmonary:      Effort: Pulmonary effort is normal  No respiratory distress  Breath sounds: Normal breath sounds  No stridor  No wheezing, rhonchi or rales  Abdominal:      General: Abdomen is flat  There is no distension  Palpations: Abdomen is soft  There is no mass  Tenderness: There is no abdominal tenderness  There is no guarding or rebound  Musculoskeletal:      Cervical back: Neck supple  No tenderness  Right lower leg: No edema  Left lower leg: No edema  Skin:     General: Skin is warm  Coloration: Skin is not jaundiced  Neurological:      General: No focal deficit present  Mental Status: She is alert and oriented to person, place, and time  Psychiatric:         Mood and Affect: Mood normal          Behavior: Behavior normal          Thought Content:  Thought content normal          Judgment: Judgment normal          Pertinent Laboratory/Diagnostic Studies:  No results found for: GLUCOSE, BUN, CREATININE, CALCIUM, NA, K, CO2, CL  No results found for: ALT, AST, GGT, ALKPHOS, BILITOT    No results found for: WBC, HGB, HCT, MCV, PLT    No results found for: TSH    No results found for: CHOL  No results found for: TRIG  No results found for: HDL  No results found for: LDLCALC  No results found for: HGBA1C    Results for orders placed or performed in visit on 11/23/20   Novel Coronavirus 2019(COVID-19), Influenza A/B, RSV [facility] [address] or [facility]    Specimen: Nose; Nares   Result Value Ref Range    SARS-COV-2 Not Detected Not Detected    INFLUENZA A PCR Not Detected Not Detected    INFLUENZA B PCR Not Detected Not Detected    RSV PCR Not Detected Not Detected       No orders of the defined types were placed in this encounter  ALLERGIES:  Allergies   Allergen Reactions    Codeine Anaphylaxis       Current Medications     Current Outpatient Medications   Medication Sig Dispense Refill    PARoxetine (PAXIL) 10 mg tablet Take 1 tablet (10 mg total) by mouth daily 30 tablet 1     No current facility-administered medications for this visit           Health Maintenance     Health Maintenance   Topic Date Due    Hepatitis C Screening  Never done    Pneumococcal Vaccine: Pediatrics (0 to 5 Years) and At-Risk Patients (6 to 59 Years) (1 of 2 - PPSV23) Never done    COVID-19 Vaccine (1) Never done    HIV Screening  Never done    Annual Physical  Never done    Influenza Vaccine (1) 09/01/2021    Depression Remission PHQ  01/04/2022    BMI: Adult  09/01/2022    Cervical Cancer Screening  01/10/2023    DTaP,Tdap,and Td Vaccines (7 - Td or Tdap) 07/28/2028    HIB Vaccine  Completed    Hepatitis B Vaccine  Completed    IPV Vaccine  Completed    Hepatitis A Vaccine  Aged Out    Meningococcal ACWY Vaccine  Aged Out    HPV Vaccine  Aged Out     Immunization History   Administered Date(s) Administered    DTP 1985, [birth year], 01/06/1986, 10/06/1987, 06/19/1990, 06/20/1990    Hep B, adult 08/15/1995, 09/19/1995, 03/11/1996, 01/10/2018, 03/22/2018, 07/24/2018    HiB 08/13/1987    IPV 03/18/1986, 05/06/1986, 01/06/1987, 06/20/1990    MMR 10/07/1986, 07/31/1991    OPV 01/06/1986, 03/18/1986, 05/06/1986, 06/20/1990    Tdap 07/28/2018          Roe Cantu DO

## 2021-09-16 ENCOUNTER — CONSULT (OUTPATIENT)
Dept: INFECTIOUS DISEASES | Facility: CLINIC | Age: 36
End: 2021-09-16
Payer: COMMERCIAL

## 2021-09-16 VITALS
DIASTOLIC BLOOD PRESSURE: 60 MMHG | TEMPERATURE: 97 F | HEART RATE: 80 BPM | SYSTOLIC BLOOD PRESSURE: 106 MMHG | RESPIRATION RATE: 16 BRPM | HEIGHT: 64 IN | BODY MASS INDEX: 20.14 KG/M2 | WEIGHT: 118 LBS

## 2021-09-16 DIAGNOSIS — G89.29 CHRONIC BILATERAL LOW BACK PAIN WITHOUT SCIATICA: ICD-10-CM

## 2021-09-16 DIAGNOSIS — M54.50 CHRONIC BILATERAL LOW BACK PAIN WITHOUT SCIATICA: ICD-10-CM

## 2021-09-16 DIAGNOSIS — R53.83 FATIGUE, UNSPECIFIED TYPE: Primary | ICD-10-CM

## 2021-09-16 DIAGNOSIS — A69.20 LYME DISEASE: ICD-10-CM

## 2021-09-16 PROCEDURE — 3008F BODY MASS INDEX DOCD: CPT | Performed by: INTERNAL MEDICINE

## 2021-09-16 PROCEDURE — 99244 OFF/OP CNSLTJ NEW/EST MOD 40: CPT | Performed by: INTERNAL MEDICINE

## 2021-09-16 PROCEDURE — 4004F PT TOBACCO SCREEN RCVD TLK: CPT | Performed by: INTERNAL MEDICINE

## 2021-09-16 NOTE — PROGRESS NOTES
Consultation - Infectious Disease   Kavon Gaytan 39 y o  female MRN: [id number]    Encounter: [id number]    IMPRESSION/RECOMMENDATIONS:   1  Lyme disease: with symptoms of headache, fatigue, polyarthritis  Lyme screen and WB IgM are both positive consistent with acute Lyme disease  Patient has been appropriately treated with a 3 week treatment course of doxycycline p o  She has had minor improvement in her symptoms and has residual fatigue since completing her course of doxy 2-3 weeks ago  Post Lyme disease treatment syndrome is considered  a  No additional Lyme disease treatment is indicated at this time  b  Screen for tick co-infection:  Ehrlichia/Anaplasma serologies and parasite smear to screen for babesiosis  c  Tick preventive measures discussed with the patient:  Wearing long sleeve clothing, tucking pants into the socks, use of DEET spray on clothing, use of permethrin cream on exposed skin, performing tick checks and showering when returning from outdoor activity  d  ID follow-up prn  2  Chronic low back pain: stable, pt has no paraspinal tenderness on exam  Straight leg raise test is unremarkable  a  Follow with PCP  3  Numbness arms/legs: Unclear etiology  I am not convinced that these symptoms are related to recent diagnosis of Lyme disease  No focal neurological deficits noted on exam   a  Follow with PCP    My recommendations were discussed with the patient and her  in detail who verbalized understanding  Their questions were answered by me  My recommendations were discussed with Dr Manny Wray, the referring provider  Thank you for allowing me to participate in the care of this patient  HISTORY OF PRESENT ILLNESS:  Reason for Consult: Lyme disease  CC: headache  HPI: Kavon Gaytan is a 39y o  year old female referred by PCP for evaluation of Lyme disease  Patient has reported headache, numbness of her left upper extremity which has been present for about one year   She reports diffuse arthritis arms, back  She also reports severe headaches  Pt has had shooting pain in her LT arm and shooting pain of her low back  She also reports numbness of her knees which shoots up/down her legs  She denies fever or chills  She has had intermittent palpitations  Patient's Lyme serology returned positive and her PCP requested further evaluation  She was treated with doxycycline 100 mg p o  b i d  for 21 day course  Pt completed doxy about 2-3 weeks ago  She does not spend a lot of time outdoors  She does not recall a tick bite or rash  She reports residual fatigue  Her arthritis and headaches have improved slightly  REVIEW OF SYSTEMS:  Constitutional: Negative for fever, chills  HENT: Negative runny nose, sore throat, congestion  Eyes: Negative for change in vision  Respiratory: Negative cough, shortness of breath  Cardiovascular: +palpitations  Negative for chest pain  Gastrointestinal: +felt queasy sometimes and had diarrhea while on doxy  Negative for abdominal pain, vomiting  Musculoskeletal: +arthritis, myalgias, back pain  Skin:  Negative for rash or wound  Neurological: +headache, leg/arm numbness  Negative for syncope, focal weakness  Hematological: Negative for excessive bruising, bleeding  Psychiatric/Behavioral: Negative for confusion, hallucination        PAST MEDICAL HISTORY:  Past Medical History:   Diagnosis Date    Bulimia nervosa     per Allscripts    Right clavicle fracture      Past Surgical History:   Procedure Laterality Date    ELBOW SURGERY      FINGER SURGERY Right     4th finger     FAMILY HISTORY:  Negative for recurrent infection    SOCIAL HISTORY:  Social History   Social History     Substance and Sexual Activity   Alcohol Use Yes    Alcohol/week: 14 0 standard drinks    Types: 14 Glasses of wine per week     Social History     Substance and Sexual Activity   Drug Use Never     Social History     Tobacco Use   Smoking Status Current Every Day Smoker    Packs/day: 0 50    Types: Cigarettes   Smokeless Tobacco Never Used   Lives at home with her , 2 yo son  Occupation: works at [employer]  ALLERGIES:  Allergies   Allergen Reactions    Codeine Anaphylaxis     MEDICATIONS:  All current active medications have been reviewed  Current Outpatient Medications:     PARoxetine (PAXIL) 10 mg tablet, Take 1 tablet (10 mg total) by mouth daily (Patient not taking: Reported on 9/16/2021), Disp: 30 tablet, Rfl: 1    PHYSICAL EXAM:  Vitals:    09/16/21 0936   BP: 106/60   BP Location: Left arm   Patient Position: Sitting   Cuff Size: Adult   Pulse: 80   Resp: 16   Temp: (!) 97 °F (36 1 °C)   TempSrc: Temporal   Weight: 53 5 kg (118 lb)   Height: 5' 4" (1 626 m)     General Appearance:  Appearing well, nontoxic, and in no distress   Head:  Normocephalic, without obvious abnormality, atraumatic   Eyes:  Conjunctiva pink and sclera anicteric, both eyes   Nose: Deferred as patient is wearing a facemask    Throat: Deferred as patient is wearing a facemask    Neck: Supple   Lungs:   Clear to auscultation bilaterally, respirations unlabored   Heart:  S1, S2, RRR; no murmur   Abdomen:   Soft, non-tender, non-distended   Extremities: No distal leg edema b/l   Skin: No rash or lesions  Lymph nodes: Cervical, supraclavicular nodes normal   Neurologic: Alert and oriented times 3, conversant, fluent speech, LE x 4, general sensation grossly intact, no paraspinal tenderness, straight leg raise test is within normal limits     LABS, IMAGING, & OTHER STUDIES:  Lab Results:  09/07/2021 COVID-19 PCR not detected  08/2/2021 Lyme IgM 1 01, Lyme AB total 1 86, Lyme WB IgM positive  04/02/2021 WBC 7 6, HGB 12, , creatinine 0 75, AST 7, ALT 14  I have personally reviewed pertinent labs, cultures,      Imaging Studies:   04/02/2021 chest x-ray PA lateral:  No acute cardiopulmonary disease  4/2/21 CT head:  Unremarkable study  I have personally reviewed pertinent imaging study reports

## 2021-09-16 NOTE — PATIENT INSTRUCTIONS
No further antibiotics are indicated at this time  Please have laboratory tests done  We will call you if there is any abnormal result  Follow up as needed

## 2021-10-04 ENCOUNTER — TELEPHONE (OUTPATIENT)
Dept: FAMILY MEDICINE CLINIC | Facility: CLINIC | Age: 36
End: 2021-10-04

## 2021-10-04 DIAGNOSIS — Z13.220 LIPID SCREENING: Primary | ICD-10-CM

## 2021-10-05 ENCOUNTER — TELEPHONE (OUTPATIENT)
Dept: FAMILY MEDICINE CLINIC | Facility: CLINIC | Age: 36
End: 2021-10-05

## 2021-10-07 ENCOUNTER — HOSPITAL ENCOUNTER (OUTPATIENT)
Dept: NON INVASIVE DIAGNOSTICS | Facility: HOSPITAL | Age: 36
Discharge: HOME | End: 2021-10-07
Attending: FAMILY MEDICINE
Payer: COMMERCIAL

## 2021-10-07 DIAGNOSIS — R42 DIZZINESS: ICD-10-CM

## 2021-10-07 DIAGNOSIS — R00.2 PALPITATIONS: ICD-10-CM

## 2021-10-07 DIAGNOSIS — F33.9 DEPRESSION, RECURRENT (HCC): ICD-10-CM

## 2021-10-07 DIAGNOSIS — A69.20 LYME DISEASE: ICD-10-CM

## 2021-10-07 DIAGNOSIS — R06.89 GASPING FOR BREATH: ICD-10-CM

## 2021-10-07 DIAGNOSIS — R53.83 FATIGUE, UNSPECIFIED TYPE: ICD-10-CM

## 2021-10-07 PROCEDURE — 93226 XTRNL ECG REC<48 HR SCAN A/R: CPT

## 2021-10-07 PROCEDURE — 93225 XTRNL ECG REC<48 HRS REC: CPT

## 2021-10-13 PROCEDURE — 93227 XTRNL ECG REC<48 HR R&I: CPT

## 2021-10-14 ENCOUNTER — CONSULT (OUTPATIENT)
Dept: CARDIOLOGY CLINIC | Facility: CLINIC | Age: 36
End: 2021-10-14
Payer: COMMERCIAL

## 2021-10-14 VITALS
HEART RATE: 82 BPM | SYSTOLIC BLOOD PRESSURE: 100 MMHG | WEIGHT: 117.2 LBS | BODY MASS INDEX: 20.01 KG/M2 | DIASTOLIC BLOOD PRESSURE: 70 MMHG | HEIGHT: 64 IN

## 2021-10-14 DIAGNOSIS — R20.2 PARESTHESIAS: ICD-10-CM

## 2021-10-14 DIAGNOSIS — A69.20 LYME DISEASE: ICD-10-CM

## 2021-10-14 DIAGNOSIS — A69.22 LYME NEUROPATHY: ICD-10-CM

## 2021-10-14 DIAGNOSIS — R94.31 ABNORMAL ECG: ICD-10-CM

## 2021-10-14 DIAGNOSIS — R07.89 OTHER CHEST PAIN: ICD-10-CM

## 2021-10-14 DIAGNOSIS — R06.89 GASPING FOR BREATH: ICD-10-CM

## 2021-10-14 DIAGNOSIS — R00.2 PALPITATIONS: Primary | ICD-10-CM

## 2021-10-14 DIAGNOSIS — R42 DIZZINESS: ICD-10-CM

## 2021-10-14 DIAGNOSIS — R53.83 FATIGUE, UNSPECIFIED TYPE: ICD-10-CM

## 2021-10-14 DIAGNOSIS — F33.9 DEPRESSION, RECURRENT (HCC): ICD-10-CM

## 2021-10-14 PROCEDURE — 99244 OFF/OP CNSLTJ NEW/EST MOD 40: CPT | Performed by: INTERNAL MEDICINE

## 2021-10-14 PROCEDURE — 4004F PT TOBACCO SCREEN RCVD TLK: CPT | Performed by: INTERNAL MEDICINE

## 2021-10-14 PROCEDURE — 3008F BODY MASS INDEX DOCD: CPT | Performed by: INTERNAL MEDICINE

## 2021-10-14 PROCEDURE — 93000 ELECTROCARDIOGRAM COMPLETE: CPT | Performed by: INTERNAL MEDICINE

## 2021-10-28 ENCOUNTER — HOSPITAL ENCOUNTER (OUTPATIENT)
Dept: NON INVASIVE DIAGNOSTICS | Facility: HOSPITAL | Age: 36
Discharge: HOME | End: 2021-10-28
Payer: COMMERCIAL

## 2021-10-28 VITALS
DIASTOLIC BLOOD PRESSURE: 63 MMHG | WEIGHT: 117 LBS | HEIGHT: 64 IN | SYSTOLIC BLOOD PRESSURE: 102 MMHG | HEART RATE: 85 BPM | BODY MASS INDEX: 19.97 KG/M2

## 2021-10-28 DIAGNOSIS — R94.31 ABNORMAL ECG: ICD-10-CM

## 2021-10-28 DIAGNOSIS — R00.2 PALPITATIONS: ICD-10-CM

## 2021-10-28 DIAGNOSIS — R07.89 OTHER CHEST PAIN: ICD-10-CM

## 2021-10-28 LAB
BASELINE ST DEPRESSION: 0 MM
MAX HR PERCENT: 92 %
MAX HR: 184 BPM
SL CV STRESS RECOVERY BP: NORMAL MMHG
SL CV STRESS RECOVERY HR: 97 BPM
SL CV STRESS RECOVERY O2 SAT: 98 %
SL CV STRESS STAGE REACHED: 4
STRESS ANGINA INDEX: 2
STRESS BASELINE BP: NORMAL MMHG
STRESS BASELINE HR: 83 BPM
STRESS DUKE TREADMILL SCORE: 2
STRESS O2 SAT REST: 99 %
STRESS PEAK HR: 171 BPM
STRESS PERCENT HR: 92 %
STRESS POST ESTIMATED WORKLOAD: 11 METS
STRESS POST EXERCISE DUR MIN: 9 MIN
STRESS POST EXERCISE DUR SEC: 37 SEC
STRESS POST O2 SAT PEAK: 100 %
STRESS POST PEAK BP: NORMAL MMHG
STRESS ST DEPRESSION: 0 MM

## 2021-10-28 PROCEDURE — 93306 TTE W/DOPPLER COMPLETE: CPT

## 2021-10-28 PROCEDURE — 93018 CV STRESS TEST I&R ONLY: CPT | Performed by: INTERNAL MEDICINE

## 2021-10-28 PROCEDURE — 93306 TTE W/DOPPLER COMPLETE: CPT | Performed by: INTERNAL MEDICINE

## 2021-10-28 PROCEDURE — 93017 CV STRESS TEST TRACING ONLY: CPT

## 2021-10-28 PROCEDURE — 93016 CV STRESS TEST SUPVJ ONLY: CPT | Performed by: INTERNAL MEDICINE

## 2021-10-29 LAB
AORTIC ROOT: 2.9 CM
AORTIC VALVE MEAN VELOCITY: 6.9 M/S
AV AREA BY CONTINUOUS VTI: 3.2 CM2
AV AREA PEAK VELOCITY: 2.8 CM2
AV LVOT MEAN GRADIENT: 2 MMHG
AV LVOT PEAK GRADIENT: 4 MMHG
AV MEAN GRADIENT: 2 MMHG
AV PEAK GRADIENT: 4 MMHG
AV VALVE AREA: 3.15 CM2
CHEST PAIN STATEMENT: NORMAL
DOP CALC AO VTI: 18.57 CM
DOP CALC LVOT AREA: 2.83 CM2
DOP CALC LVOT DIAMETER: 1.9 CM
DOP CALC LVOT PEAK VEL VTI: 20.67 CM
DOP CALC LVOT PEAK VEL: 0.98 M/S
DOP CALC LVOT STROKE INDEX: 38.5 ML/M2
DOP CALC LVOT STROKE VOLUME: 58.58 CM3
E WAVE DECELERATION TIME: 150 MS
FRACTIONAL SHORTENING: 32 % (ref 28–44)
INTERVENTRICULAR SEPTUM IN DIASTOLE (PARASTERNAL SHORT AXIS VIEW): 0.9 CM
LAAS-AP2: 15.3 CM2
LAAS-AP4: 14.1 CM2
LEFT INTERNAL DIMENSION IN SYSTOLE: 2.8 CM (ref 2.1–4)
LEFT VENTRICULAR INTERNAL DIMENSION IN DIASTOLE: 4.1 CM (ref 3.68–5.48)
LEFT VENTRICULAR POSTERIOR WALL IN END DIASTOLE: 0.8 CM
LEFT VENTRICULAR STROKE VOLUME: 46 ML
LV EF: 54 %
MAX DIASTOLIC BP: 60 MMHG
MAX HEART RATE: 171 BPM
MAX PREDICTED HEART RATE: 184 BPM
MAX. SYSTOLIC BP: 146 MMHG
MV E'TISSUE VEL-LAT: 19 CM/S
MV E'TISSUE VEL-SEP: 15 CM/S
MV PEAK A VEL: 0.54 M/S
MV PEAK E VEL: 67 CM/S
PROTOCOL NAME: NORMAL
REASON FOR TERMINATION: NORMAL
RIGHT ATRIAL 2D VOLUME: 28 ML
RIGHT VENTRICLE ID DIMENSION: 3 CM
SL CV LV EF: 60
SL CV PED ECHO LEFT VENTRICLE DIASTOLIC VOLUME (MOD BIPLANE) 2D: 76 ML
SL CV PED ECHO LEFT VENTRICLE SYSTOLIC VOLUME (MOD BIPLANE) 2D: 30 ML
TARGET HR FORMULA: NORMAL
TEST INDICATION: NORMAL
TIME IN EXERCISE PHASE: NORMAL
TRICUSPID VALVE S': 46 CM/S
Z-SCORE OF LEFT VENTRICULAR DIMENSION IN END SYSTOLE: -0.9

## 2022-02-23 ENCOUNTER — OFFICE VISIT (OUTPATIENT)
Dept: CARDIOLOGY CLINIC | Facility: CLINIC | Age: 37
End: 2022-02-23
Payer: COMMERCIAL

## 2022-02-23 ENCOUNTER — APPOINTMENT (OUTPATIENT)
Dept: LAB | Facility: HOSPITAL | Age: 37
End: 2022-02-23
Attending: INTERNAL MEDICINE
Payer: COMMERCIAL

## 2022-02-23 VITALS
SYSTOLIC BLOOD PRESSURE: 122 MMHG | HEIGHT: 64 IN | DIASTOLIC BLOOD PRESSURE: 78 MMHG | BODY MASS INDEX: 19.97 KG/M2 | OXYGEN SATURATION: 98 % | WEIGHT: 117 LBS | HEART RATE: 102 BPM

## 2022-02-23 DIAGNOSIS — R53.83 FATIGUE, UNSPECIFIED TYPE: Primary | ICD-10-CM

## 2022-02-23 DIAGNOSIS — R94.39 ABNORMAL FINDING ON CARDIOVASCULAR STRESS TEST: ICD-10-CM

## 2022-02-23 DIAGNOSIS — R00.2 HEART PALPITATIONS: ICD-10-CM

## 2022-02-23 DIAGNOSIS — Z72.0 TOBACCO ABUSE: ICD-10-CM

## 2022-02-23 DIAGNOSIS — A69.22 LYME NEUROPATHY: ICD-10-CM

## 2022-02-23 DIAGNOSIS — A69.20 LYME DISEASE: ICD-10-CM

## 2022-02-23 LAB
ALBUMIN SERPL BCP-MCNC: 4 G/DL (ref 3.5–5)
ALP SERPL-CCNC: 52 U/L (ref 46–116)
ALT SERPL W P-5'-P-CCNC: 13 U/L (ref 12–78)
ANION GAP SERPL CALCULATED.3IONS-SCNC: 9 MMOL/L (ref 4–13)
AST SERPL W P-5'-P-CCNC: 16 U/L (ref 5–45)
BILIRUB SERPL-MCNC: 0.56 MG/DL (ref 0.2–1)
BUN SERPL-MCNC: 10 MG/DL (ref 5–25)
CALCIUM SERPL-MCNC: 9.1 MG/DL (ref 8.3–10.1)
CHLORIDE SERPL-SCNC: 103 MMOL/L (ref 100–108)
CK SERPL-CCNC: 56 U/L (ref 26–192)
CO2 SERPL-SCNC: 27 MMOL/L (ref 21–32)
CREAT SERPL-MCNC: 0.71 MG/DL (ref 0.6–1.3)
CRP SERPL QL: <3 MG/L
ERYTHROCYTE [SEDIMENTATION RATE] IN BLOOD: 3 MM/HOUR (ref 0–19)
GFR SERPL CREATININE-BSD FRML MDRD: 109 ML/MIN/1.73SQ M
GLUCOSE SERPL-MCNC: 91 MG/DL (ref 65–140)
POTASSIUM SERPL-SCNC: 4.5 MMOL/L (ref 3.5–5.3)
PROT SERPL-MCNC: 7.7 G/DL (ref 6.4–8.2)
SODIUM SERPL-SCNC: 139 MMOL/L (ref 136–145)

## 2022-02-23 PROCEDURE — 36415 COLL VENOUS BLD VENIPUNCTURE: CPT | Performed by: INTERNAL MEDICINE

## 2022-02-23 PROCEDURE — 80053 COMPREHEN METABOLIC PANEL: CPT

## 2022-02-23 PROCEDURE — 3008F BODY MASS INDEX DOCD: CPT | Performed by: INTERNAL MEDICINE

## 2022-02-23 PROCEDURE — 87207 SMEAR SPECIAL STAIN: CPT

## 2022-02-23 PROCEDURE — 82550 ASSAY OF CK (CPK): CPT

## 2022-02-23 PROCEDURE — 85652 RBC SED RATE AUTOMATED: CPT | Performed by: INTERNAL MEDICINE

## 2022-02-23 PROCEDURE — 87798 DETECT AGENT NOS DNA AMP: CPT

## 2022-02-23 PROCEDURE — 4004F PT TOBACCO SCREEN RCVD TLK: CPT | Performed by: INTERNAL MEDICINE

## 2022-02-23 PROCEDURE — 86140 C-REACTIVE PROTEIN: CPT

## 2022-02-23 PROCEDURE — 86038 ANTINUCLEAR ANTIBODIES: CPT | Performed by: INTERNAL MEDICINE

## 2022-02-23 PROCEDURE — 99214 OFFICE O/P EST MOD 30 MIN: CPT | Performed by: INTERNAL MEDICINE

## 2022-02-23 NOTE — PATIENT INSTRUCTIONS
CARDIOLOGY ASSESSMENT & PLAN     1  Fatigue, unspecified type  COMPREHENSIVE METABOLIC LDNUF(54)    C-reactive protein    Sedimentation rate, automated    ANDRES Screen w/ Reflex to Titer/Pattern    AMB extended holter monitor    Echo stress test, exercise    Creatine Kinase, Total   2  Lyme neuropathy  COMPREHENSIVE METABOLIC JDFKT(09)    C-reactive protein    Sedimentation rate, automated    ANDRES Screen w/ Reflex to Titer/Pattern    AMB extended holter monitor    Echo stress test, exercise    Creatine Kinase, Total   3  Heart palpitations  COMPREHENSIVE METABOLIC VSIRM(85)    C-reactive protein    Sedimentation rate, automated    ANDRES Screen w/ Reflex to Titer/Pattern    AMB extended holter monitor    Echo stress test, exercise   4  Abnormal finding on cardiovascular stress test  Echo stress test, exercise   5  Tobacco abuse     6  Lyme disease       Other chest pain  Ms  Justen Ambriz is noted to have very frequent symptoms of chest pains  Description of symptom is not typical of angina  She she did have significant and limiting pain during exercise treadmill stress test   Her workup so far has not revealed any specific abnormality  I am concerned if she has symptoms chronic fatigue syndrome versus PMR     -- I am requesting some blood work including inflammatory markers and CK levels  -- she states she is already scheduled to have blood work to evaluate for sequelae of Lyme  -- I am arranging a her 2 week extended Holter monitor for evaluation of her palpitations  -- given significant chest pain without ECG changes during the stress test we will arrange for a stress echocardiogram   -- advising her to continue normal activities and maintain a diary of symptoms and bring the diary for our review at next visit  Follow-up will be arranged for 2 months

## 2022-02-23 NOTE — PROGRESS NOTES
[facility]  [email or web address] [place] [address]  Phone#  [phone]  Fax#  [phone]  *-*-*-*-*-*-*-*-*-*-*-*-*-*-*-*-*-*-*-*-*-*-*-*-*-*-*-*-*-*-*-*-*-*-*-*-*-*-*-*-*-*-*-*-*-*-*-*-*-*-*-*-*-*  ENCOUNTER DATE: 02/23/22 3:34 PM  PATIENT NAME: Divina Valdez   1985    [id number]  AGE:36 y o  SEX: female  Carmen Coyne MD     PRIMARY CARE PHYSICIAN: Bernard Crane DO    DIAGNOSES:  1  Palpitations, chest pains, paresthesias, dizziness and shortness of breath  2  History of anxiety and depression   3  History of bulimia nervosa   4  Lyme infection, 2021    HOLTER MONITOR OCTOBER 2021:  1  Holter monitor reveals the underlying rhythm is sinus rhythm with an average heart rate of 77 beats per minute, a minimum heart rate of 43 beats per minute and a maximum heart rate of 136 beats per minute  2  There were 0 ventricular ectopic beats, with no evidence of ventricular tachycardia  3  There were about 19 supraventricular ectopic beats, mostly occurring as single PAC's and late beats with no evidence of supraventricular tachycardia, atrial fibrillation, or atrial flutter  4  There is no evidence of significant bradyarrhythmia or advanced heart block  The longest R to R was 1 5 seconds  5  The patient's symptom diary reported "hurting and palpitations" on 3 occasions, One of which was associated with a mild sinus tachycardia at 111 bpm     ECHOCARDIOGRAM October 28, 2021:  Normal left ventricular size and systolic and diastolic function, EF 76%, normal right ventricular size and systolic function, normal left and right atrial size, normal aortic valve, normal mitral valve, trace tricuspid valve regurgitation, no obvious pulmonary hypertension, no pericardial effusion  EXERCISE TREADMILL STRESS test October 28, 2021:  1  Negative exercise stress test for ECG changes of ischemia following exercise up to 92% of patient's maximal age predicted heart rate  Patient did have limiting chest pain beginning towards the end of exercise with cessation of symptoms during immediate recovery  2  Good exercise capacity  3  Normal resting blood pressure with appropriate blood pressure response to exercise  4  Normal resting ECG with no significant changes and no significant arrhythmia noted during stress test      Consider noncardiac cause for chest pain, however if symptoms persist and pretest probability for CAD is high consider doing stress test with imaging modality to improve sensitivity  CURRENT ECG   No results found for this visit on 02/23/22  CARDIOLOGY ASSESSMENT & PLAN     1  Fatigue, unspecified type  COMPREHENSIVE METABOLIC BXFIN(80)    C-reactive protein    Sedimentation rate, automated    ANDRES Screen w/ Reflex to Titer/Pattern    AMB extended holter monitor    Echo stress test, exercise    Creatine Kinase, Total   2  Lyme neuropathy  COMPREHENSIVE METABOLIC UIGFD(19)    C-reactive protein    Sedimentation rate, automated    ANDRES Screen w/ Reflex to Titer/Pattern    AMB extended holter monitor    Echo stress test, exercise    Creatine Kinase, Total   3  Heart palpitations  COMPREHENSIVE METABOLIC BWXAP(25)    C-reactive protein    Sedimentation rate, automated    ANDRES Screen w/ Reflex to Titer/Pattern    AMB extended holter monitor    Echo stress test, exercise   4  Abnormal finding on cardiovascular stress test  Echo stress test, exercise   5  Tobacco abuse     6  Lyme disease       Other chest pain  Ms Dara Whittaker is noted to have very frequent symptoms of chest pains  Description of symptom is not typical of angina  She she did have significant and limiting pain during exercise treadmill stress test   Her workup so far has not revealed any specific abnormality  I am concerned if she has symptoms chronic fatigue syndrome versus PMR     -- I am requesting some blood work including inflammatory markers and CK levels    -- she states she is already scheduled to have blood work to evaluate for sequelae of Lyme  -- I am arranging a her 2 week extended Holter monitor for evaluation of her palpitations  -- given significant chest pain without ECG changes during the stress test we will arrange for a stress echocardiogram   -- advising her to continue normal activities and maintain a diary of symptoms and bring the diary for our review at next visit  Follow-up will be arranged for 2 months  INTERVAL HISTORY & HISTORY OF PRESENT ILLNESS     Ariel Divina is here for follow-up regarding her cardiac comorbidities which include:  Palpitations shortness of breath chest pain  She was last seen by me on October 14, 2021 for evaluation these symptoms  Description of symptoms was not typical for angina or arrhythmia  She was referred to undergo an echocardiogram a treadmill stress test and a Holter monitor  As there was some concern for Lyme neuropathy a referral was made her to be evaluated by neurologist     Today she reports that she has continued to fairly significant chest pain, palpitations and severe fatigue in the last several months  Reports that her symptoms are becoming more frequent now  Describes chest pain as intermittent fairly frequent sharp pain beginning in the left lateral chest and sometimes from the front of the chest appears in through her nipple to the back  Also reports intermittent tightness and numbness in left upper extremity  Symptoms are so severe that they hamper her ability to perform normal activities  Sometimes feels anxiety and palpitations  Has had no presyncope/syncope  She reports that she has not seen neurologist has recommended at last visit  She denies any focal neurologic symptoms  Denies any visual symptoms  Functional capacity status: Moderate   (Excellent- >10 METs; Good: (7-10 METs); Moderate (4-7 METs);  Poor (<= 4 METs)    Any chronic stressors:  None   (feeling of poor health, financial problems, and social isolation etc)  Tobacco or alcohol dependence:  Smokes about 2-3 cigarettes a day  Drinks beer 2 to 3 times a week  Current cardiac medications:  She does not take any chronic medications    REVIEW OF SYSTEMS   Positive for: As noted above in HPI  Negative for: All remaining as reviewed below and in HPI  SYSTEM SYMPTOMS REVIEWED:  General--weight change, fever, night sweats  Respiratory--cough, wheezing, shortness of breath, sputum production  Cardiovascular--chest pain, syncope, dyspnea on exertion, edema, decline in exercise tolerance, claudication   Gastrointestinal--persistent vomiting, diarrhea, abdominal distention, blood in stool   Muscular or skeletal--joint pain or swelling   Neurologic--headaches, syncope, abnormal movement  Hematologic--history of easy bruising and bleeding   Endocrine--thyroid enlargement, heat or cold intolerance, polyuria   Psychiatric--anxiety, depression     *-*-*-*-*-*-*-*-*-*-*-*-*-*-*-*-*-*-*-*-*-*-*-*-*-*-*-*-*-*-*-*-*-*-*-*-*-*-*-*-*-*-*-*-*-*-*-*-*-*-*-*-*-*-  VITAL SIGNS     CURRENT VITAL SIGNS:   Vitals:    02/23/22 1438   BP: 122/78   Pulse: 102   SpO2: 98%   Weight: 53 1 kg (117 lb)   Height: 5' 4" (1 626 m)       BMI: Body mass index is 20 08 kg/m²      WEIGHTS:   Wt Readings from Last 25 Encounters:   02/23/22 53 1 kg (117 lb)   10/28/21 53 1 kg (117 lb)   10/14/21 53 2 kg (117 lb 3 2 oz)   09/16/21 53 5 kg (118 lb)   09/01/21 55 kg (121 lb 3 2 oz)   08/11/21 53 8 kg (118 lb 9 6 oz)   01/04/21 55 1 kg (121 lb 6 4 oz)   05/19/20 54 5 kg (120 lb 1 6 oz)   04/15/20 54 3 kg (119 lb 12 8 oz)   02/24/20 55 4 kg (122 lb 3 2 oz)   09/06/19 54 kg (119 lb 0 8 oz)   09/01/15 51 4 kg (113 lb 6 1 oz)   07/14/15 49 9 kg (110 lb)   12/16/13 53 1 kg (117 lb)        *-*-*-*-*-*-*-*-*-*-*-*-*-*-*-*-*-*-*-*-*-*-*-*-*-*-*-*-*-*-*-*-*-*-*-*-*-*-*-*-*-*-*-*-*-*-*-*-*-*-*-*-*-*-  PHYSICAL EXAM     General Appearance:    Alert, cooperative, no distress, appears stated age, normal built   Head, Eyes, ENT:    No obvious abnormality, moist mucous mebranes  Neck:   Supple, no carotid bruit or JVD   Back:     Symmetric, no curvature  Lungs:     Respirations unlabored  Clear to auscultation bilaterally,    Chest wall:    No tenderness or deformity   Heart:    Regular rate and rhythm, S1 and S2 normal, no murmur, rub  or gallop  Abdomen:     Soft, non-tender, No obvious masses, or organomegaly   Extremities:   Extremities warm, no cyanosis or edema    Skin:   No venostatic changes in lower extremities  Normal skin color, texture, and turgor  No rashes or lesions     *-*-*-*-*-*-*-*-*-*-*-*-*-*-*-*-*-*-*-*-*-*-*-*-*-*-*-*-*-*-*-*-*-*-*-*-*-*-*-*-*-*-*-*-*-*-*-*-*-*-*-*-*-*-  CURRENT MEDICATIONS LIST     Current Outpatient Medications:     PARoxetine (PAXIL) 10 mg tablet, Take 1 tablet (10 mg total) by mouth daily (Patient not taking: Reported on 10/14/2021), Disp: 30 tablet, Rfl: 1       ALLERGIES     Allergies   Allergen Reactions    Codeine Anaphylaxis       *-*-*-*-*-*-*-*-*-*-*-*-*-*-*-*-*-*-*-*-*-*-*-*-*-*-*-*-*-*-*-*-*-*-*-*-*-*-*-*-*-*-*-*-*-*-*-*-*-*-*-*-*-*-  LABORATORY DATA   No results found for: NA  No results found for: NA, K, CL, CO2, ANIONGAP, BUN, CREATININE, EGFR, GLUCOSE, CALCIUM, AST, ALT, ALKPHOS, PROT, BILITOT, MG  No results found for: WBC, HGB, PLT  No results found for: PT, PTT, INR  No results found for: CKMB, DIGOXIN  No results found for: TSH  No results found for: CHOL   No results found for: HGBA1C  No results found for: BLOODCX, SPUTUMCULTUR, GRAMSTAIN, URINECX, WOUNDCULT, BODYFLUIDCUL, MRSACULTURE, INFLUAPCR, INFLUBPCR, RSVPCR, LEGIONELLAUR, CDIFFTOXINB    *-*-*-*-*-*-*-*-*-*-*-*-*-*-*-*-*-*-*-*-*-*-*-*-*-*-*-*-*-*-*-*-*-*-*-*-*-*-*-*-*-*-*-*-*-*-*-*-*-*-*-*-*-*-  PREVIOUS CARDIOLOGY & RADIOLOGY TEST RESULTS   I have personally reviewed pertinent results of cardiovascular tests noted below      No results found for this or any previous visit  No results found for this or any previous visit  No results found for this or any previous visit  No results found for this or any previous visit  Stress strip  Confirmed by [address] ([id number]),  Donna Jj ([phone]) on 10/29/2021 11:47:49 AM  Echo complete w/ contrast if indicated    Left Ventricle: Left ventricular cavity size is normal  The left   ventricular ejection fraction is 60%  Systolic function is normal  Wall   motion is normal  Diastolic function is normal     Normal valvular structures        *-*-*-*-*-*-*-*-*-*-*-*-*-*-*-*-*-*-*-*-*-*-*-*-*-*-*-*-*-*-*-*-*-*-*-*-*-*-*-*-*-*-*-*-*-*-*-*-*-*-*-*-*-*-  SIGNATURES:   @SIG@   Neda Archer MD; [facility]    *-*-*-*-*-*-*-*-*-*-*-*-*-*-*-*-*-*-*-*-*-*-*-*-*-*-*-*-*-*-*-*-*-*-*-*-*-*-*-*-*-*-*-*-*-*-*-*-*-*-*-*-*-*-  PAST MEDICAL HISTORY:  Past Medical History:   Diagnosis Date    Bulimia nervosa     per [employer]    Right clavicle fracture     PAST SURGICAL HISTORY:  Past Surgical History:   Procedure Laterality Date    ELBOW SURGERY      FINGER SURGERY Right     4th finger         FAMILY HISTORY:  No family history on file   SOCIAL HISTORY:  Social History     Tobacco Use   Smoking Status Current Every Day Smoker    Packs/day: 0 50    Types: Cigarettes   Smokeless Tobacco Never Used      Social History     Substance and Sexual Activity   Alcohol Use Yes    Alcohol/week: 14 0 standard drinks    Types: 14 Glasses of wine per week     Social History     Substance and Sexual Activity   Drug Use Never    [id number]     *-*-*-*-*-*-*-*-*-*-*-*-*-*-*-*-*-*-*-*-*-*-*-*-*-*-*-*-*-*-*-*-*-*-*-*-*-*-*-*-*-*-*-*-*-*-*-*-*-*-*-*-*-*  ALLERGIES:  Allergies   Allergen Reactions    Codeine Anaphylaxis    CURRENT SCHEDULED MEDICATIONS:    Current Outpatient Medications:     PARoxetine (PAXIL) 10 mg tablet, Take 1 tablet (10 mg total) by mouth daily (Patient not taking: Reported on 10/14/2021), Disp: 30 tablet, Rfl: 1 *-*-*-*-*-*-*-*-*-*-*-*-*-*-*-*-*-*-*-*-*-*-*-*-*-*-*-*-*-*-*-*-*-*-*-*-*-*-*-*-*-*-*-*-*-*-*-*-*-*-*-*-*-*

## 2022-02-23 NOTE — ASSESSMENT & PLAN NOTE
Ms Tavares Holt is noted to have very frequent symptoms of chest pains  Description of symptom is not typical of angina  She she did have significant and limiting pain during exercise treadmill stress test   Her workup so far has not revealed any specific abnormality  I am concerned if she has symptoms chronic fatigue syndrome versus PMR     -- I am requesting some blood work including inflammatory markers and CK levels  -- she states she is already scheduled to have blood work to evaluate for sequelae of Lyme  -- I am arranging a her 2 week extended Holter monitor for evaluation of her palpitations  -- given significant chest pain without ECG changes during the stress test we will arrange for a stress echocardiogram   -- advising her to continue normal activities and maintain a diary of symptoms and bring the diary for our review at next visit  Follow-up will be arranged for 2 months

## 2022-02-24 LAB
% PARASITEMIA: 0
PARASITE BLD: NO
PATHOLOGIST INTERPRETATION: NORMAL

## 2022-02-25 LAB — RYE IGE QN: NEGATIVE

## 2022-02-28 LAB — A PHAGOCYTOPH DNA BLD QL NAA+PROBE: NEGATIVE

## 2022-03-14 ENCOUNTER — TELEPHONE (OUTPATIENT)
Dept: CARDIOLOGY CLINIC | Facility: CLINIC | Age: 37
End: 2022-03-14

## 2022-03-14 NOTE — TELEPHONE ENCOUNTER
Patient was to call Dr Jessee Diop for her test results  Please call or give report for us to call her     [phone]

## 2022-03-21 ENCOUNTER — TELEPHONE (OUTPATIENT)
Dept: CARDIOLOGY CLINIC | Facility: CLINIC | Age: 37
End: 2022-03-21

## 2022-03-21 NOTE — TELEPHONE ENCOUNTER
Tried calling patient to review the results of her blood work  I could not reach the patient and was unable to leave a message as the mailbox was full  I have reviewed her blood work and it indicates overall normal results  There is was no evidence of inflammation that could be contributing her symptoms  We will follow up on her stress echocardiogram and extended Holter monitor as scheduled  She will also follow-up with Neurology for evaluation of possible neuropathy      Sivan Rowe MD

## 2022-03-23 ENCOUNTER — HOSPITAL ENCOUNTER (OUTPATIENT)
Dept: NON INVASIVE DIAGNOSTICS | Facility: HOSPITAL | Age: 37
Discharge: HOME | End: 2022-03-23
Attending: INTERNAL MEDICINE
Payer: COMMERCIAL

## 2022-03-23 VITALS — HEIGHT: 64 IN | BODY MASS INDEX: 19.97 KG/M2 | WEIGHT: 117 LBS

## 2022-03-23 DIAGNOSIS — R94.39 ABNORMAL FINDING ON CARDIOVASCULAR STRESS TEST: ICD-10-CM

## 2022-03-23 DIAGNOSIS — R53.83 FATIGUE, UNSPECIFIED TYPE: ICD-10-CM

## 2022-03-23 DIAGNOSIS — A69.22 LYME NEUROPATHY: ICD-10-CM

## 2022-03-23 DIAGNOSIS — R00.2 HEART PALPITATIONS: ICD-10-CM

## 2022-03-23 PROCEDURE — 93350 STRESS TTE ONLY: CPT

## 2022-03-24 LAB
CHEST PAIN STATEMENT: NORMAL
MAX DIASTOLIC BP: 72 MMHG
MAX HEART RATE: 181 BPM
MAX HR PERCENT: 98 %
MAX HR: 184 BPM
MAX PREDICTED HEART RATE: 184 BPM
MAX. SYSTOLIC BP: 152 MMHG
PROTOCOL NAME: NORMAL
RATE PRESSURE PRODUCT: NORMAL
SL CV LV EF: 58
SL CV STRESS RECOVERY BP: NORMAL MMHG
SL CV STRESS RECOVERY HR: 100 BPM
SL CV STRESS RECOVERY O2 SAT: 100 %
SL CV STRESS STAGE REACHED: 3
STRESS ANGINA INDEX: 0
STRESS BASELINE BP: NORMAL MMHG
STRESS BASELINE HR: 94 BPM
STRESS O2 SAT REST: 100 %
STRESS PEAK HR: 181 BPM
STRESS PERCENT HR: 98 %
STRESS POST ESTIMATED WORKLOAD: 10.1 METS
STRESS POST EXERCISE DUR MIN: 9 MIN
STRESS POST O2 SAT PEAK: 99 %
STRESS POST PEAK BP: 144 MMHG
STRESS TARGET HR: 181 BPM
TARGET HR FORMULA: NORMAL
TIME IN EXERCISE PHASE: NORMAL

## 2022-03-24 PROCEDURE — 93351 STRESS TTE COMPLETE: CPT | Performed by: INTERNAL MEDICINE

## 2022-03-25 NOTE — TELEPHONE ENCOUNTER
Patient left message requesting results of blood work and stress Echo  Spoke with patient, advised Dr Jacqueline Carter did attempt to contact patient, reviewed results of blood work  Advised will have Dr Jacqueline Carter review stress Echo and call with results  Please call patient with results of stress Echo    Thanks

## 2022-06-23 ENCOUNTER — CLINICAL SUPPORT (OUTPATIENT)
Dept: CARDIOLOGY CLINIC | Facility: CLINIC | Age: 37
End: 2022-06-23
Payer: COMMERCIAL

## 2022-06-23 DIAGNOSIS — R00.2 HEART PALPITATIONS: ICD-10-CM

## 2022-06-23 DIAGNOSIS — R53.83 FATIGUE, UNSPECIFIED TYPE: ICD-10-CM

## 2022-06-23 DIAGNOSIS — A69.22 LYME NEUROPATHY: ICD-10-CM

## 2022-06-23 PROCEDURE — 93248 EXT ECG>7D<15D REV&INTERPJ: CPT | Performed by: INTERNAL MEDICINE

## 2022-08-11 ENCOUNTER — TELEPHONE (OUTPATIENT)
Dept: NEUROLOGY | Facility: CLINIC | Age: 37
End: 2022-08-11

## 2022-08-11 NOTE — TELEPHONE ENCOUNTER
Attempt x 1 to contact patient to confirm her 8/16/2022 @ 3 pm appointment with Dr Heide Welch at the [facility] was unsuccessful as her phone mailbox was full

## 2022-08-12 ENCOUNTER — TELEPHONE (OUTPATIENT)
Dept: NEUROLOGY | Facility: CLINIC | Age: 37
End: 2022-08-12

## 2022-08-12 NOTE — TELEPHONE ENCOUNTER
Spoke to patient to confirm her 8/16/2022 appointment with Dr Mimi Toscano  Patient would like to reschedule her appointment due to taking care of grandmother  Advised patient no appointment available at [facility] or [facility] until January (first appointment given to patient already taken in December) and that we could place on wait list for sooner appointment  Patient possibly would like appointment in closer location to her home  She may call back Monday to [phone] to see if there is an appointment for her closer to her home

## 2022-10-07 ENCOUNTER — APPOINTMENT (OUTPATIENT)
Dept: LAB | Facility: MEDICAL CENTER | Age: 37
End: 2022-10-07
Payer: COMMERCIAL

## 2022-10-07 ENCOUNTER — OFFICE VISIT (OUTPATIENT)
Dept: FAMILY MEDICINE CLINIC | Facility: CLINIC | Age: 37
End: 2022-10-07
Payer: COMMERCIAL

## 2022-10-07 VITALS
RESPIRATION RATE: 12 BRPM | HEIGHT: 64 IN | OXYGEN SATURATION: 98 % | WEIGHT: 115.8 LBS | SYSTOLIC BLOOD PRESSURE: 123 MMHG | HEART RATE: 88 BPM | BODY MASS INDEX: 19.77 KG/M2 | DIASTOLIC BLOOD PRESSURE: 75 MMHG

## 2022-10-07 DIAGNOSIS — R20.0 LIP NUMBNESS: ICD-10-CM

## 2022-10-07 DIAGNOSIS — G89.29 CHRONIC BILATERAL LOW BACK PAIN WITHOUT SCIATICA: ICD-10-CM

## 2022-10-07 DIAGNOSIS — M54.50 CHRONIC BILATERAL LOW BACK PAIN WITHOUT SCIATICA: ICD-10-CM

## 2022-10-07 DIAGNOSIS — R53.83 OTHER FATIGUE: Primary | ICD-10-CM

## 2022-10-07 DIAGNOSIS — R53.83 OTHER FATIGUE: ICD-10-CM

## 2022-10-07 DIAGNOSIS — H53.9 VISION CHANGES: ICD-10-CM

## 2022-10-07 LAB
ALBUMIN SERPL BCP-MCNC: 4.3 G/DL (ref 3.5–5)
ALP SERPL-CCNC: 52 U/L (ref 46–116)
ALT SERPL W P-5'-P-CCNC: 18 U/L (ref 12–78)
ANION GAP SERPL CALCULATED.3IONS-SCNC: 5 MMOL/L (ref 4–13)
AST SERPL W P-5'-P-CCNC: 10 U/L (ref 5–45)
BILIRUB SERPL-MCNC: 0.37 MG/DL (ref 0.2–1)
BUN SERPL-MCNC: 13 MG/DL (ref 5–25)
CALCIUM SERPL-MCNC: 9.5 MG/DL (ref 8.3–10.1)
CHLORIDE SERPL-SCNC: 106 MMOL/L (ref 96–108)
CO2 SERPL-SCNC: 25 MMOL/L (ref 21–32)
CREAT SERPL-MCNC: 0.71 MG/DL (ref 0.6–1.3)
FOLATE SERPL-MCNC: 13.2 NG/ML (ref 3.1–17.5)
GFR SERPL CREATININE-BSD FRML MDRD: 109 ML/MIN/1.73SQ M
GLUCOSE SERPL-MCNC: 87 MG/DL (ref 65–140)
POTASSIUM SERPL-SCNC: 4.9 MMOL/L (ref 3.5–5.3)
PROT SERPL-MCNC: 7.9 G/DL (ref 6.4–8.4)
SODIUM SERPL-SCNC: 136 MMOL/L (ref 135–147)
TSH SERPL DL<=0.05 MIU/L-ACNC: 0.55 UIU/ML (ref 0.45–4.5)
VIT B12 SERPL-MCNC: 308 PG/ML (ref 100–900)

## 2022-10-07 PROCEDURE — 36415 COLL VENOUS BLD VENIPUNCTURE: CPT

## 2022-10-07 PROCEDURE — 84443 ASSAY THYROID STIM HORMONE: CPT

## 2022-10-07 PROCEDURE — 82607 VITAMIN B-12: CPT

## 2022-10-07 PROCEDURE — 82746 ASSAY OF FOLIC ACID SERUM: CPT

## 2022-10-07 PROCEDURE — 99214 OFFICE O/P EST MOD 30 MIN: CPT | Performed by: FAMILY MEDICINE

## 2022-10-07 PROCEDURE — 80053 COMPREHEN METABOLIC PANEL: CPT

## 2022-10-07 RX ORDER — DULOXETIN HYDROCHLORIDE 30 MG/1
30 CAPSULE, DELAYED RELEASE ORAL DAILY
Qty: 30 CAPSULE | Refills: 2 | Status: SHIPPED | OUTPATIENT
Start: 2022-10-07

## 2022-10-07 NOTE — PATIENT INSTRUCTIONS
1  Other fatigue  -     TSH, 3rd generation with Free T4 reflex; Future  -     Vitamin B12; Future  -     Folate; Future  -     MRI brain w wo contrast; Future; Expected date: 10/07/2022  -     Comprehensive metabolic panel; Future    2  Lip numbness  -     TSH, 3rd generation with Free T4 reflex; Future  -     Vitamin B12; Future  -     Folate; Future  -     MRI brain w wo contrast; Future; Expected date: 10/07/2022    3  Chronic bilateral low back pain without sciatica  Assessment & Plan:  Given pain for many years along with fatigue and nerve pains will do trial of Cymbalta advised follow-up in 6 weeks to see how well this medication is working    Orders:  -     DULoxetine (Cymbalta) 30 mg delayed release capsule; Take 1 capsule (30 mg total) by mouth daily    4  Vision changes  Assessment & Plan: Will check MRI to rule out neurological causes such as MS,Given other symptoms including fatigue and lipid numbness advised patient to see eye doctor for a eye examination        Orders:  -     MRI brain w wo contrast; Future; Expected date: 10/07/2022

## 2022-10-07 NOTE — ASSESSMENT & PLAN NOTE
Given pain for many years along with fatigue and nerve pains will do trial of Cymbalta advised follow-up in 6 weeks to see how well this medication is working

## 2022-10-07 NOTE — ASSESSMENT & PLAN NOTE
Will check MRI to rule out neurological causes such as MS,Given other symptoms including fatigue and lipid numbness advised patient to see eye doctor for a eye examination

## 2022-10-07 NOTE — PROGRESS NOTES
Assessment/Plan:       Problem List Items Addressed This Visit        Other    Chronic bilateral low back pain without sciatica     Given pain for many years along with fatigue and nerve pains will do trial of Cymbalta advised follow-up in 6 weeks to see how well this medication is working         Relevant Medications    DULoxetine (Cymbalta) 30 mg delayed release capsule    Fatigue - Primary    Relevant Orders    TSH, 3rd generation with Free T4 reflex    Vitamin B12    Folate    MRI brain w wo contrast    Comprehensive metabolic panel    Vision changes     Will check MRI to rule out neurological causes such as MS,Given other symptoms including fatigue and lipid numbness advised patient to see eye doctor for a eye examination  Relevant Orders    MRI brain w wo contrast      Other Visit Diagnoses     Lip numbness        Relevant Orders    TSH, 3rd generation with Free T4 reflex    Vitamin B12    Folate    MRI brain w wo contrast            Subjective:      Patient ID: Tavares Holt is a 40 y o  female  HPI     40year old presenting for fatigue and lip numbness  She has had this for last week she feels like it is spreading across her lower lip  She does note she has had other shooting sensation down her arms she was worked up for cardiologist when this was in her left arm and no cardiac cause was found  She has felt intermittent back pain that shooting pains in her back  She also notes she has been very fatigued for the last 2 years this was thought to be secondary to Lyme but does not seem to be improving  Also experience vision changes often on for the last year  She will intermittently see spots in her vision after she rests will disappear,  She does not note any muscle weakness    She unfortunately had to cancel her Neurology appointment his speech is plain follow-up in December        The following portions of the patient's history were reviewed and updated as appropriate: allergies, current medications, past family history, past medical history, past social history, past surgical history and problem list       Current Outpatient Medications:     DULoxetine (Cymbalta) 30 mg delayed release capsule, Take 1 capsule (30 mg total) by mouth daily, Disp: 30 capsule, Rfl: 2     Review of Systems   Constitutional: Negative for activity change and appetite change  Respiratory: Negative for apnea and chest tightness  Gastrointestinal: Negative for abdominal distention and abdominal pain  Musculoskeletal: Negative for arthralgias and back pain  Objective:      /75 (BP Location: Left arm, Patient Position: Sitting, Cuff Size: Standard)   Pulse 88   Resp 12   Ht 5' 4" (1 626 m)   Wt 52 5 kg (115 lb 12 8 oz)   SpO2 98%   BMI 19 88 kg/m²          Physical Exam  Constitutional:       Appearance: Normal appearance  Cardiovascular:      Rate and Rhythm: Normal rate and regular rhythm  Pulmonary:      Breath sounds: Normal breath sounds  Musculoskeletal:         General: Normal range of motion  Neurological:      General: No focal deficit present  Mental Status: She is alert and oriented to person, place, and time  Cranial Nerves: No cranial nerve deficit  Sensory: Sensory deficit present  Motor: No weakness        Gait: Gait normal       Comments: Able to feel lower lip but feels tingling sensation           Stacey Winn

## 2022-11-13 ENCOUNTER — APPOINTMENT (EMERGENCY)
Dept: ULTRASOUND IMAGING | Facility: HOSPITAL | Age: 37
End: 2022-11-13

## 2022-11-13 ENCOUNTER — HOSPITAL ENCOUNTER (EMERGENCY)
Facility: HOSPITAL | Age: 37
Discharge: HOME | End: 2022-11-13
Attending: EMERGENCY MEDICINE

## 2022-11-13 VITALS
SYSTOLIC BLOOD PRESSURE: 115 MMHG | OXYGEN SATURATION: 100 % | DIASTOLIC BLOOD PRESSURE: 72 MMHG | HEART RATE: 80 BPM | WEIGHT: 117.06 LBS | TEMPERATURE: 98.3 F | BODY MASS INDEX: 20.09 KG/M2 | RESPIRATION RATE: 16 BRPM

## 2022-11-13 DIAGNOSIS — Z3A.01 LESS THAN 8 WEEKS GESTATION OF PREGNANCY: ICD-10-CM

## 2022-11-13 DIAGNOSIS — K29.70 GASTRITIS: Primary | ICD-10-CM

## 2022-11-13 LAB
ALBUMIN SERPL BCP-MCNC: 3.6 G/DL (ref 3.5–5)
ALP SERPL-CCNC: 44 U/L (ref 46–116)
ALT SERPL W P-5'-P-CCNC: 15 U/L (ref 12–78)
ANION GAP SERPL CALCULATED.3IONS-SCNC: 7 MMOL/L (ref 4–13)
B-HCG SERPL-ACNC: 3798 MIU/ML (ref 0–11.6)
BACTERIA UR QL AUTO: ABNORMAL /HPF
BASOPHILS # BLD AUTO: 0.02 THOUSANDS/ÂΜL (ref 0–0.1)
BASOPHILS NFR BLD AUTO: 1 % (ref 0–1)
BILIRUB SERPL-MCNC: 0.68 MG/DL (ref 0.2–1)
BILIRUB UR QL STRIP: NEGATIVE
BUN SERPL-MCNC: 6 MG/DL (ref 5–25)
CALCIUM SERPL-MCNC: 8.8 MG/DL (ref 8.3–10.1)
CHLORIDE SERPL-SCNC: 102 MMOL/L (ref 96–108)
CLARITY UR: CLEAR
CO2 SERPL-SCNC: 27 MMOL/L (ref 21–32)
COLOR UR: YELLOW
CREAT SERPL-MCNC: 0.59 MG/DL (ref 0.6–1.3)
EOSINOPHIL # BLD AUTO: 0.15 THOUSAND/ÂΜL (ref 0–0.61)
EOSINOPHIL NFR BLD AUTO: 5 % (ref 0–6)
ERYTHROCYTE [DISTWIDTH] IN BLOOD BY AUTOMATED COUNT: 11.9 % (ref 11.6–15.1)
EXT PREG TEST URINE: ABNORMAL
EXT. CONTROL ED NAV: ABNORMAL
GFR SERPL CREATININE-BSD FRML MDRD: 117 ML/MIN/1.73SQ M
GLUCOSE SERPL-MCNC: 86 MG/DL (ref 65–140)
GLUCOSE UR STRIP-MCNC: NEGATIVE MG/DL
HCT VFR BLD AUTO: 33.7 % (ref 34.8–46.1)
HGB BLD-MCNC: 11.2 G/DL (ref 11.5–15.4)
HGB UR QL STRIP.AUTO: ABNORMAL
IMM GRANULOCYTES # BLD AUTO: 0.01 THOUSAND/UL (ref 0–0.2)
IMM GRANULOCYTES NFR BLD AUTO: 0 % (ref 0–2)
KETONES UR STRIP-MCNC: NEGATIVE MG/DL
LEUKOCYTE ESTERASE UR QL STRIP: ABNORMAL
LIPASE SERPL-CCNC: 45 U/L (ref 73–393)
LYMPHOCYTES # BLD AUTO: 1.06 THOUSANDS/ÂΜL (ref 0.6–4.47)
LYMPHOCYTES NFR BLD AUTO: 33 % (ref 14–44)
MCH RBC QN AUTO: 34 PG (ref 26.8–34.3)
MCHC RBC AUTO-ENTMCNC: 33.2 G/DL (ref 31.4–37.4)
MCV RBC AUTO: 102 FL (ref 82–98)
MONOCYTES # BLD AUTO: 0.31 THOUSAND/ÂΜL (ref 0.17–1.22)
MONOCYTES NFR BLD AUTO: 10 % (ref 4–12)
NEUTROPHILS # BLD AUTO: 1.67 THOUSANDS/ÂΜL (ref 1.85–7.62)
NEUTS SEG NFR BLD AUTO: 51 % (ref 43–75)
NITRITE UR QL STRIP: NEGATIVE
NON-SQ EPI CELLS URNS QL MICRO: ABNORMAL /HPF
NRBC BLD AUTO-RTO: 0 /100 WBCS
PH UR STRIP.AUTO: 5.5 [PH] (ref 4.5–8)
PLATELET # BLD AUTO: 204 THOUSANDS/UL (ref 149–390)
PMV BLD AUTO: 9.3 FL (ref 8.9–12.7)
POTASSIUM SERPL-SCNC: 3.7 MMOL/L (ref 3.5–5.3)
PROT SERPL-MCNC: 7 G/DL (ref 6.4–8.4)
PROT UR STRIP-MCNC: NEGATIVE MG/DL
RBC # BLD AUTO: 3.29 MILLION/UL (ref 3.81–5.12)
RBC #/AREA URNS AUTO: ABNORMAL /HPF
SODIUM SERPL-SCNC: 136 MMOL/L (ref 135–147)
SP GR UR STRIP.AUTO: <=1.005 (ref 1–1.03)
UROBILINOGEN UR QL STRIP.AUTO: 0.2 E.U./DL
WBC # BLD AUTO: 3.22 THOUSAND/UL (ref 4.31–10.16)
WBC #/AREA URNS AUTO: ABNORMAL /HPF

## 2022-11-13 RX ORDER — LIDOCAINE HYDROCHLORIDE 20 MG/ML
15 SOLUTION OROPHARYNGEAL ONCE
Status: COMPLETED | OUTPATIENT
Start: 2022-11-13 | End: 2022-11-13

## 2022-11-13 RX ORDER — FAMOTIDINE 20 MG/1
20 TABLET, FILM COATED ORAL ONCE
Status: COMPLETED | OUTPATIENT
Start: 2022-11-13 | End: 2022-11-13

## 2022-11-13 RX ORDER — MAGNESIUM HYDROXIDE/ALUMINUM HYDROXICE/SIMETHICONE 120; 1200; 1200 MG/30ML; MG/30ML; MG/30ML
30 SUSPENSION ORAL ONCE
Status: COMPLETED | OUTPATIENT
Start: 2022-11-13 | End: 2022-11-13

## 2022-11-13 RX ADMIN — LIDOCAINE HYDROCHLORIDE 15 ML: 20 SOLUTION ORAL; TOPICAL at 17:08

## 2022-11-13 RX ADMIN — SODIUM CHLORIDE 1000 ML: 0.9 INJECTION, SOLUTION INTRAVENOUS at 15:20

## 2022-11-13 RX ADMIN — FAMOTIDINE 20 MG: 20 TABLET ORAL at 17:08

## 2022-11-13 RX ADMIN — ALUMINUM HYDROXIDE, MAGNESIUM HYDROXIDE, AND DIMETHICONE 30 ML: 200; 20; 200 SUSPENSION ORAL at 17:07

## 2022-11-13 NOTE — ED NOTES
PT advised to provide urine sample  Pt mentioned just using the restroom while in the waiting room  I gave PT a cup anyway and advised we would need it for the tests that our provider would want to perform       Zhen Nicolas  11/13/22 [id number]

## 2022-11-13 NOTE — ED PROVIDER NOTES
History  Chief Complaint   Patient presents with   • Abdominal Pain     Pt reports stabbing and burning pain in abd pain, chills, weakness, blood in urine starting yesterday  HPI  Antwan Lambert is a 40 y o  female with PMH significant for Bullimia Nervosa coming in today with complaint of abdominal  She reports this started yesterday  Describes sharp epigastric pain, non-radiating, moderate severity  No alleviating factors  Denies any association with eating  She also reports blood in her urine as well  She is concerned she has a gastric ulcer  She has had decreased appetite, no nausea, or vomiting or diarrhea  Otherwise denies any fevers, chills, shortness of breath, episodes of syncope, history of abdominal surgeries, sick contacts  Her LMP was over 4 weeks ago  She does smoke 1/2 ppd and drinks alcohol regularly, up to 4 drinks per day  No other drugs  No other complaints at this time       - No language barrier  -PFH/PSH reviewed  - History obtained from patient and chart    Prior to Admission Medications   Prescriptions Last Dose Informant Patient Reported? Taking? DULoxetine (Cymbalta) 30 mg delayed release capsule   No No   Sig: Take 1 capsule (30 mg total) by mouth daily      Facility-Administered Medications: None       Past Medical History:   Diagnosis Date   • Bulimia nervosa     per [employer]   • Right clavicle fracture        Past Surgical History:   Procedure Laterality Date   • ELBOW SURGERY     • FINGER SURGERY Right     4th finger       History reviewed  No pertinent family history  I have reviewed and agree with the history as documented      E-Cigarette/Vaping   • E-Cigarette Use Never User      E-Cigarette/Vaping Substances   • Nicotine No    • THC No    • CBD No    • Flavoring No    • Other No    • Unknown No      Social History     Tobacco Use   • Smoking status: Current Every Day Smoker     Packs/day: 0 50     Types: Cigarettes   • Smokeless tobacco: Never Used   Vaping Use   • Vaping Use: Never used   Substance Use Topics   • Alcohol use: Yes     Alcohol/week: 14 0 standard drinks     Types: 14 Glasses of wine per week   • Drug use: Never        Review of Systems   Constitutional: Positive for appetite change  Negative for fever  HENT: Negative for sore throat  Eyes: Negative for pain and visual disturbance  Respiratory: Negative for shortness of breath  Cardiovascular: Negative for chest pain and palpitations  Gastrointestinal: Positive for abdominal pain  Negative for vomiting  Genitourinary: Positive for hematuria  Musculoskeletal: Negative for back pain  Skin: Negative for color change and rash  Neurological: Negative for syncope  All other systems reviewed and are negative  Physical Exam  ED Triage Vitals   Temperature Pulse Respirations Blood Pressure SpO2   11/13/22 1244 11/13/22 1243 11/13/22 1243 11/13/22 1243 11/13/22 1243   98 3 °F (36 8 °C) 84 20 136/87 100 %      Temp Source Heart Rate Source Patient Position - Orthostatic VS BP Location FiO2 (%)   11/13/22 1244 11/13/22 1243 11/13/22 1243 11/13/22 1243 --   Oral Monitor Sitting Right arm       Pain Score       11/13/22 1243       9             Orthostatic Vital Signs  Vitals:    11/13/22 1243 11/13/22 1524 11/13/22 1711   BP: 136/87 126/72 115/72   Pulse: 84 72 80   Patient Position - Orthostatic VS: Sitting Lying Sitting       Physical Exam  Vitals and nursing note reviewed  Constitutional:       General: She is not in acute distress  Appearance: She is well-developed  HENT:      Head: Normocephalic and atraumatic  Eyes:      Conjunctiva/sclera: Conjunctivae normal    Cardiovascular:      Rate and Rhythm: Normal rate and regular rhythm  Heart sounds: No murmur heard  Pulmonary:      Effort: Pulmonary effort is normal  No respiratory distress  Breath sounds: Normal breath sounds  No wheezing or rales  Abdominal:      Palpations: Abdomen is soft  Tenderness:  There is abdominal tenderness in the epigastric area  There is no guarding or rebound  Musculoskeletal:      Cervical back: Neck supple  Skin:     General: Skin is warm and dry  Coloration: Skin is not mottled or pale  Neurological:      General: No focal deficit present  Mental Status: She is alert           ED Medications  Medications   sodium chloride 0 9 % bolus 1,000 mL (0 mL Intravenous Stopped 11/13/22 1707)   aluminum-magnesium hydroxide-simethicone (MYLANTA) oral suspension 30 mL (30 mL Oral Given 11/13/22 1707)   Lidocaine Viscous HCl (XYLOCAINE) 2 % mucosal solution 15 mL (15 mL Swish & Spit Given 11/13/22 1708)   famotidine (PEPCID) tablet 20 mg (20 mg Oral Given 11/13/22 1708)       Diagnostic Studies  Results Reviewed     Procedure Component Value Units Date/Time    hCG, quantitative [[id number]]  (Abnormal) Collected: 11/13/22 1501    Lab Status: Final result Specimen: Blood from Arm, Right Updated: 11/13/22 1559     HCG, Quant 3,798 mIU/mL     Narrative:       Expected Ranges:     Approximate               Approximate HCG  Gestation age          Concentration ( mIU/mL)  _____________          ______________________   Pablo Ferguson                      HCG values  0 2-1                       5-50  1-2                           2-3                         100-5000  3-4                         500-51256  4-5                         1000-61568  5-6                         [id number]  6-8                         46846-020475  8-12                        [id number]      Comprehensive metabolic panel [[id number]]  (Abnormal) Collected: 11/13/22 1501    Lab Status: Final result Specimen: Blood from Arm, Right Updated: 11/13/22 1539     Sodium 136 mmol/L      Potassium 3 7 mmol/L      Chloride 102 mmol/L      CO2 27 mmol/L      ANION GAP 7 mmol/L      BUN 6 mg/dL      Creatinine 0 59 mg/dL      Glucose 86 mg/dL      Calcium 8 8 mg/dL      AST --     ALT 15 U/L      Alkaline Phosphatase 44 U/L      Total Protein 7 0 g/dL      Albumin 3 6 g/dL      Total Bilirubin 0 68 mg/dL      eGFR 117 ml/min/1 73sq m     Narrative:      [place] guidelines for Chronic Kidney Disease (CKD):   •  Stage 1 with normal or high GFR (GFR > 90 mL/min/1 73 square meters)  •  Stage 2 Mild CKD (GFR = 60-89 mL/min/1 73 square meters)  •  Stage 3A Moderate CKD (GFR = 45-59 mL/min/1 73 square meters)  •  Stage 3B Moderate CKD (GFR = 30-44 mL/min/1 73 square meters)  •  Stage 4 Severe CKD (GFR = 15-29 mL/min/1 73 square meters)  •  Stage 5 End Stage CKD (GFR <15 mL/min/1 73 square meters)  Note: GFR calculation is accurate only with a steady state creatinine    Lipase [[id number]]  (Abnormal) Collected: 11/13/22 1501    Lab Status: Final result Specimen: Blood from Arm, Right Updated: 11/13/22 1531     Lipase 45 u/L     Urine Microscopic [[id number]]  (Abnormal) Collected: 11/13/22 1406    Lab Status: Final result Specimen: Urine, Clean Catch Updated: 11/13/22 1529     RBC, UA 1-2 /hpf      WBC, UA 4-10 /hpf      Epithelial Cells Occasional /hpf      Bacteria, UA Occasional /hpf     CBC and differential [[id number]]  (Abnormal) Collected: 11/13/22 1501    Lab Status: Final result Specimen: Blood from Arm, Right Updated: 11/13/22 1508     WBC 3 22 Thousand/uL      RBC 3 29 Million/uL      Hemoglobin 11 2 g/dL      Hematocrit 33 7 %       fL      MCH 34 0 pg      MCHC 33 2 g/dL      RDW 11 9 %      MPV 9 3 fL      Platelets 002 Thousands/uL      nRBC 0 /100 WBCs      Neutrophils Relative 51 %      Immat GRANS % 0 %      Lymphocytes Relative 33 %      Monocytes Relative 10 %      Eosinophils Relative 5 %      Basophils Relative 1 %      Neutrophils Absolute 1 67 Thousands/µL      Immature Grans Absolute 0 01 Thousand/uL      Lymphocytes Absolute 1 06 Thousands/µL      Monocytes Absolute 0 31 Thousand/µL      Eosinophils Absolute 0 15 Thousand/µL      Basophils Absolute 0 02 Thousands/µL     POCT pregnancy, urine [[id number]]  (Abnormal) Resulted: 11/13/22 1409    Lab Status: Final result Updated: 11/13/22 1410     EXT PREG TEST UR (Ref: Negative) Positive (+)     Control Valid    Urine Macroscopic, POC [[id number]]  (Abnormal) Collected: 11/13/22 1406    Lab Status: Final result Specimen: Urine Updated: 11/13/22 1408     Color, UA Yellow     Clarity, UA Clear     pH, UA 5 5     Leukocytes, UA Trace     Nitrite, UA Negative     Protein, UA Negative mg/dl      Glucose, UA Negative mg/dl      Ketones, UA Negative mg/dl      Urobilinogen, UA 0 2 E U /dl      Bilirubin, UA Negative     Occult Blood, UA Small     Specific [place], UA <=1 005    Narrative:      CLINITEK RESULT                 US OB < 14 weeks with transvaginal   Final Result by Joe Wall MD (11/13 1630)      4 mm intrauterine gestational sac containing a yolk sac and 2 mm fetal pole without fetal cardiac activity which is likely from early gestational age  Short-term follow-up with serial beta-hCG and pelvic/OB ultrasound recommended in 2 weeks  The study was marked in EPIC for significant notification  Workstation performed: [id number]         US right upper quadrant   Final Result by Gauri Schaefer MD (93/06 [id number])      1  No gallstones, gallbladder wall thickening, or biliary ductal dilatation  Negative Modi sign  Incidental 2 mm gallbladder polyp  2  Less than 1 cm hyperechoic nodule within the right hepatic lobe    In a patient of this age, and if there is no history of primary malignancy elsewhere, this is almost certainly representative of a benign cavernous hemangioma of the liver            Workstation performed: [id number]               Procedures  Procedures      ED Course  ED Course as of 11/14/22 1249   Sun Nov 13, 2022   1437 PREGNANCY TEST URINE: Positive (+)   1442 PREGNANCY TEST URINE: Positive (+)   1532 Lipase(!): 45   1544 eGFR: 117   1600 HCG QUANTITATIVE(!): 3,798   1626 Awaiting US study reads, remainder of workup only remarkable for an early IUP   1631 Adding on GI cocktail, Pepcid   1633 US OB < 14 weeks with transvaginal   1703 Pt currently improved at this time, no further questions, feels okay to go home                                       MDM  Number of Diagnoses or Management Options  Gastritis  Less than 8 weeks gestation of pregnancy  Diagnosis management comments: Tamiko Archer is a 40 y o  who presents with complaints of abdominal pain    Vital signs are stable, physical exam shows epigastric tenderness, no peritoneal signs, benign cardiorespiratory exam, moist mucous membranes    Ddx: Pancreatitis vs gastritis vs UTI vs IUP vs other  Plan: CBC, CMP, Lipase, UA, UPT, IVF, will monitor closely    Summary:  Overall her workup was remarkable for an intrauterine pregnancy  Also likely consistent with gastritis given her negative workup otherwise  No other abnormalities on her workup  Therefore referred to outpatient OB and advised on close follow up  Her pain improved and she remained HDS therefore felt safe to send home  Recommended close follow-up  Advised on strict return precautions  Amount and/or Complexity of Data Reviewed  Clinical lab tests: ordered and reviewed  Tests in the radiology section of CPT®: ordered and reviewed    Risk of Complications, Morbidity, and/or Mortality  Presenting problems: moderate  Diagnostic procedures: low  Management options: low    Patient Progress  Patient progress: stable  I discussed the above care and treatment plan with the pt and answered all of their relevant questions  Reviewed all details of the treatment plan and course  She expressed understanding, was agreeable to the plan of care, and had no further questions or concerns  Portions of the record may have been created with voice recognition software  Occasional wrong word or "sound a like" substitutions may have occurred due to the inherent limitations of voice recognition software    Read the chart carefully and recognize, using context, where substitutions have occurred      Disposition  Final diagnoses:   Gastritis   Less than 8 weeks gestation of pregnancy     Time reflects when diagnosis was documented in both MDM as applicable and the Disposition within this note     Time User Action Codes Description Comment    11/13/2022  4:35 PM Milbert Perla Add [K29 70] Gastritis     11/13/2022  4:35 PM Milbert Perla Add [Z3A 01] Less than 8 weeks gestation of pregnancy       ED Disposition     ED Disposition   Discharge    Condition   Stable    Date/Time   Sun Nov 13, 2022  5:03 PM    Comment   Inga Mayen discharge to home/self care  Follow-up Information    None         Discharge Medication List as of 11/13/2022  5:04 PM      CONTINUE these medications which have NOT CHANGED    Details   DULoxetine (Cymbalta) 30 mg delayed release capsule Take 1 capsule (30 mg total) by mouth daily, Starting Fri 10/7/2022, Normal           No discharge procedures on file  PDMP Review     None           ED Provider  Attending physically available and evaluated Inga Mayen I managed the patient along with the ED Attending      Electronically Signed by         Keyon Hernandez MD  11/14/22 8748

## 2022-11-13 NOTE — ED ATTENDING ATTESTATION
11/13/2022  IAdelaide DO, saw and evaluated the patient  I have discussed the patient with the resident/non-physician practitioner and agree with the resident's/non-physician practitioner's findings, Plan of Care, and MDM as documented in the resident's/non-physician practitioner's note, except where noted  All available labs and Radiology studies were reviewed  I was present for key portions of any procedure(s) performed by the resident/non-physician practitioner and I was immediately available to provide assistance  At this point I agree with the current assessment done in the Emergency Department  I have conducted an independent evaluation of this patient a history and physical is as follows:    39 yo F epigastric pain since yesterday  Pain localized to epigastric region/periumbilical region, does not radiate  Describes as burning and bloating  Associated with nausea without vomiting  +Low back discomfort  +Blood in urine  Denies fevers, CP, vaginal bleeding, urinary complaints  LMP 3 weeks ago  Daily ETOH, tobacco use    MDM: 39 yo F with epigastric pain- symptom management, abdominal labs/rule out pancreatitis, UA/preg    Urine pregnancy test resulted positive, pt was unaware of this  LMP 3 weeks ago  H/o 1 prior pregnancy 4 years ago  Will do hcg quant/pelvic US     Dispo pending workup          ED Course         Critical Care Time  Procedures

## 2022-11-13 NOTE — DISCHARGE INSTRUCTIONS
Here are your ultrasound results:    US OB < 14 weeks with transvaginal: 4 mm intrauterine gestational sac containing a yolk sac and 2 mm fetal pole without fetal cardiac activity which is likely from early gestational age  Short-term follow-up with serial beta-hCG and pelvic/OB ultrasound recommended in 2 weeks  1  No gallstones, gallbladder wall thickening, or biliary ductal dilatation  Negative Modi sign  Incidental 2 mm gallbladder polyp  2  Less than 1 cm hyperechoic nodule within the right hepatic lobe  In a patient of this age, and if there is no history of primary malignancy elsewhere, this is almost certainly representative of a benign cavernous hemangioma of the liver    You need to see a OBGYN doctor  You may take Pepcid over the counter as needed for gastritis       Please return if any worsening pain or bleeding    Please consider stopping smoking and drinking during your pregnancy

## 2022-11-18 ENCOUNTER — OFFICE VISIT (OUTPATIENT)
Dept: OBGYN CLINIC | Facility: CLINIC | Age: 37
End: 2022-11-18

## 2022-11-18 VITALS
HEIGHT: 64 IN | HEART RATE: 83 BPM | OXYGEN SATURATION: 98 % | BODY MASS INDEX: 20.32 KG/M2 | SYSTOLIC BLOOD PRESSURE: 102 MMHG | WEIGHT: 119 LBS | TEMPERATURE: 98.2 F | DIASTOLIC BLOOD PRESSURE: 74 MMHG

## 2022-11-18 DIAGNOSIS — O36.80X0 PREGNANCY WITH UNCERTAIN FETAL VIABILITY, SINGLE OR UNSPECIFIED FETUS: ICD-10-CM

## 2022-11-18 NOTE — PROGRESS NOTES
Subjective   Patient ID: Martir Weston is a 40 y o  female  Patient is here for a problem visit  Chief Complaint   Patient presents with   • Pregnancy Problem     Pregnancy - confirmed @ ER , pt now bleeding, some cramping, feels like she is on her period  New patient  Seen in ER on  with epigastric pain and incidentally found to be pregnant   TVUS with evidence of IUP - YS and fetal pole but no cardiac activity visualized   Unsure LMP 10/24     Few days after  ER visit started having pink/brown discharge which became heavier bleeding like a period, associated with some cramping, more severe last night - today slight cramping     Heavy menses at baseline  Regular cycles    Unplanned but desired pregnancy      No recent BCM     Historically blood type O+        Menstrual History:  OB History        4    Para   1    Term   1            AB   2    Living   1       SAB        IAB        Ectopic        Multiple        Live Births                    Patient's last menstrual period was 10/24/2022 (approximate)  Past Medical History:   Diagnosis Date   • Anxiety    • Bulimia nervosa     per [employer]   • Right clavicle fracture        Past Surgical History:   Procedure Laterality Date   • ELBOW SURGERY     • FINGER SURGERY Right     4th finger       Social History     Tobacco Use   • Smoking status: Every Day     Packs/day: 0 50     Types: Cigarettes   • Smokeless tobacco: Never   Vaping Use   • Vaping Use: Never used   Substance Use Topics   • Alcohol use:  Yes     Alcohol/week: 14 0 standard drinks     Types: 14 Glasses of wine per week     Comment: occasional   • Drug use: Never        Allergies   Allergen Reactions   • Codeine Anaphylaxis         Current Outpatient Medications:   •  DULoxetine (Cymbalta) 30 mg delayed release capsule, Take 1 capsule (30 mg total) by mouth daily (Patient not taking: Reported on 2022), Disp: 30 capsule, Rfl: 2      Review of Systems Constitutional: Negative for appetite change, chills and fever  Eyes: Negative for visual disturbance  Respiratory: Negative for cough, chest tightness and shortness of breath  Cardiovascular: Negative for chest pain  Gastrointestinal: Negative for abdominal distention, abdominal pain, constipation, diarrhea, nausea and vomiting  Endocrine: Negative for cold intolerance and heat intolerance  Genitourinary: Positive for pelvic pain and vaginal discharge  Negative for difficulty urinating, dyspareunia, dysuria, frequency, genital sores, urgency, vaginal bleeding and vaginal pain  Musculoskeletal: Negative for arthralgias  Neurological: Negative for light-headedness and headaches  Hematological: Does not bruise/bleed easily  Psychiatric/Behavioral: Negative for behavioral problems  All other systems reviewed and are negative  /74 (BP Location: Left arm, Patient Position: Sitting, Cuff Size: Adult)   Pulse 83   Temp 98 2 °F (36 8 °C) (Tympanic)   Ht 5' 4" (1 626 m)   Wt 54 kg (119 lb)   LMP 10/24/2022 (Approximate)   SpO2 98%   BMI 20 43 kg/m²       Physical Exam  Constitutional:       General: She is not in acute distress  Appearance: Normal appearance  She is not ill-appearing  Genitourinary:      Bladder and urethral meatus normal       Right Labia: No rash, tenderness, lesions or skin changes  Left Labia: No tenderness, lesions, skin changes or rash  No labial fusion noted  No inguinal adenopathy present in the right or left side  Vaginal bleeding present  No vaginal discharge, erythema, tenderness or ulceration  Vaginal exam comments: 1 cm clot at external os, no active bleeding   Right Adnexa: not tender, not full and no mass present  Left Adnexa: not tender, not full and no mass present  No cervical motion tenderness, discharge, friability, lesion, polyp or eversion        Cervical exam comments: External os soft, fingertip dilated   Uterus is not enlarged, fixed, tender or irregular  No uterine mass detected  Uterus is anteverted  Pelvic exam was performed with patient in the lithotomy position  HENT:      Head: Normocephalic  Cardiovascular:      Rate and Rhythm: Normal rate  Heart sounds: Normal heart sounds  Pulmonary:      Effort: Pulmonary effort is normal  No accessory muscle usage or respiratory distress  Abdominal:      General: There is no distension  Palpations: Abdomen is soft  There is no mass  Tenderness: There is no abdominal tenderness  There is no guarding or rebound  Musculoskeletal:         General: Normal range of motion  Cervical back: No rigidity  Lymphadenopathy:      Lower Body: No right inguinal adenopathy  No left inguinal adenopathy  Neurological:      General: No focal deficit present  Mental Status: She is alert  Mental status is at baseline  Skin:     General: Skin is warm and dry  Psychiatric:         Mood and Affect: Mood normal          Behavior: Behavior normal    Vitals and nursing note reviewed  Exam conducted with a chaperone present  FIRST TRIMESTER OBSTETRIC ULTRASOUND  11/18/22     Dee Kiser MD        INDICATION: Amenorrhea, viability     COMPARISON: None      TECHNIQUE:   Transvaginal imaging was performed to assess the gestation, myometrial/endometrial architecture and ovarian parenchymal detail     The study includes volumetric sweeps and traditional still imaging technique       FINDINGS:     A single intrauterine gestation is identified  Cardiac activity is detected but is not measurable due to early gestational age      YOLK SAC:  Present and normal in size and appearance    MEAN CROWN RUMP LENGTH:  2 3 mm = 5 weeks 5 days   AMNIOTIC FLUID/SAC SHAPE:  Within expected normal range      UTERUS/ADNEXA:   No adnexal mass or pathologic cyst   No free fluid identified      IMPRESSION:     Single intrauterine pregnancy of 5 weeks 5 days gestational age  [facility] 7/16/23 by ultrasound at this encounter   Fetal cardiac activity detected  No adnexal masses seen  Additional Findings: none           Ultrasound Probe Disinfection     A transvaginal ultrasound was performed  Prior to use, disinfection was performed with High Level Disinfection Process (Trophon)  Probe serial number F: [id number] was used  Appropriate laboratory testing, imaging studies, and prior external records were reviewed:     Assessment/Plan:       Problem List Items Addressed This Visit        Other    Pregnancy with uncertain fetal viability     SLIUP seen with FHR noted to be low although unable to measure due to small size     Discussed concern for threatened miscarriage based on exam findings  Will return for repeat US in 1 week  Precautions reviewed          Relevant Orders    AMB US OB < 14 weeks single or first gestation level 1 (Completed)

## 2022-11-18 NOTE — ASSESSMENT & PLAN NOTE
SLIUP seen with FHR noted to be low although unable to measure due to small size     Discussed concern for threatened miscarriage based on exam findings  Will return for repeat US in 1 week  Precautions reviewed

## 2022-11-21 ENCOUNTER — OFFICE VISIT (OUTPATIENT)
Dept: OBGYN CLINIC | Facility: CLINIC | Age: 37
End: 2022-11-21

## 2022-11-21 VITALS
DIASTOLIC BLOOD PRESSURE: 82 MMHG | BODY MASS INDEX: 20.25 KG/M2 | SYSTOLIC BLOOD PRESSURE: 124 MMHG | OXYGEN SATURATION: 99 % | TEMPERATURE: 97.6 F | HEIGHT: 64 IN | HEART RATE: 107 BPM | WEIGHT: 118.6 LBS

## 2022-11-21 DIAGNOSIS — O03.9 COMPLETE MISCARRIAGE: Primary | ICD-10-CM

## 2022-11-21 NOTE — PROGRESS NOTES
Subjective   Patient ID: Ney Canales is a 40 y o  female  Patient is here for a problem visit  Chief Complaint   Patient presents with   • Vaginal Bleeding     Bleeding and cramping- pt thinks she passed the fetus this morning, cramping is subsiding     Seen  - Roxi Pacheco seen at that time, 5+5 WGA although FHR low  Since then had increased cramping and heavy vaginal bleeding - less than 1 hour prior to appt passed tissue like material and since then cramping has dissipated and VB is decreased     Blood type O+    Menstrual History:  OB History        4    Para   1    Term   1            AB   2    Living   1       SAB        IAB        Ectopic        Multiple        Live Births                    Patient's last menstrual period was 10/24/2022 (approximate)  Past Medical History:   Diagnosis Date   • Anxiety    • Bulimia nervosa     per [employer]   • Right clavicle fracture        Past Surgical History:   Procedure Laterality Date   • ELBOW SURGERY     • FINGER SURGERY Right     4th finger       Social History     Tobacco Use   • Smoking status: Every Day     Packs/day: 0 50     Types: Cigarettes   • Smokeless tobacco: Never   Vaping Use   • Vaping Use: Never used   Substance Use Topics   • Alcohol use: Yes     Alcohol/week: 14 0 standard drinks     Types: 14 Glasses of wine per week     Comment: occasional   • Drug use: Never        Allergies   Allergen Reactions   • Codeine Anaphylaxis         Current Outpatient Medications:   •  DULoxetine (Cymbalta) 30 mg delayed release capsule, Take 1 capsule (30 mg total) by mouth daily (Patient not taking: Reported on 2022), Disp: 30 capsule, Rfl: 2      Review of Systems   Constitutional: Negative for appetite change, chills and fever  Eyes: Negative for visual disturbance  Respiratory: Negative for cough, chest tightness and shortness of breath  Cardiovascular: Negative for chest pain     Gastrointestinal: Negative for abdominal distention, abdominal pain, constipation, diarrhea, nausea and vomiting  Endocrine: Negative for cold intolerance and heat intolerance  Genitourinary: Positive for pelvic pain and vaginal bleeding  Negative for difficulty urinating, dyspareunia, dysuria, frequency, genital sores, urgency, vaginal discharge and vaginal pain  Musculoskeletal: Negative for arthralgias  Neurological: Negative for light-headedness and headaches  Hematological: Does not bruise/bleed easily  Psychiatric/Behavioral: Negative for behavioral problems  All other systems reviewed and are negative  /82 (BP Location: Right arm, Patient Position: Sitting, Cuff Size: Adult)   Pulse (!) 107   Temp 97 6 °F (36 4 °C) (Tympanic)   Ht 5' 4" (1 626 m)   Wt 53 8 kg (118 lb 9 6 oz)   LMP 10/24/2022 (Approximate)   SpO2 99%   BMI 20 36 kg/m²       Physical Exam  Constitutional:       General: She is not in acute distress  Appearance: Normal appearance  She is not ill-appearing  Genitourinary:      Bladder and urethral meatus normal       Right Labia: No rash, tenderness, lesions or skin changes  Left Labia: No tenderness, lesions, skin changes or rash  No labial fusion noted  No inguinal adenopathy present in the right or left side  Vaginal bleeding present  No vaginal discharge, erythema, tenderness or ulceration  Vaginal exam comments: [place] sized clot sitting at os  Right Adnexa: not tender, not full and no mass present  Left Adnexa: not tender, not full and no mass present  No cervical motion tenderness, discharge, friability, lesion, polyp or eversion  Cervical exam comments: Soft/1 cm dilated   Uterus is not enlarged, fixed, tender or irregular  No uterine mass detected  Uterus is anteverted  Pelvic exam was performed with patient in the lithotomy position  HENT:      Head: Normocephalic     Cardiovascular:      Rate and Rhythm: Normal rate       Heart sounds: Normal heart sounds  Pulmonary:      Effort: Pulmonary effort is normal  No accessory muscle usage or respiratory distress  Abdominal:      General: There is no distension  Palpations: Abdomen is soft  There is no mass  Tenderness: There is no abdominal tenderness  There is no guarding or rebound  Musculoskeletal:         General: Normal range of motion  Cervical back: No rigidity  Lymphadenopathy:      Lower Body: No right inguinal adenopathy  No left inguinal adenopathy  Neurological:      General: No focal deficit present  Mental Status: She is alert  Mental status is at baseline  Skin:     General: Skin is warm and dry  Psychiatric:         Mood and Affect: Mood normal          Behavior: Behavior normal    Vitals and nursing note reviewed  Exam conducted with a chaperone present  Limited transvaginal ultrasound: with 7 6 mm stripe, no evidence of gestational sac or retained products of conception       Appropriate laboratory testing, imaging studies, and prior external records were reviewed:     Assessment/Plan:       Problem List Items Addressed This Visit        Other    Complete miscarriage - Primary     Patient with likely complete miscarriage based on today's US and history  Blood type historically O+  Given decrease in pain and vaginal bleeding and low suspicion for retained products can continue to monitor expectantly  Advised motrin as needed for pain and pelvic rest  She has a f/u appt already scheduled for next week   Strict precautions reviewed

## 2022-11-21 NOTE — ASSESSMENT & PLAN NOTE
Patient with likely complete miscarriage based on today's US and history  Blood type historically O+  Given decrease in pain and vaginal bleeding and low suspicion for retained products can continue to monitor expectantly  Advised motrin as needed for pain and pelvic rest  She has a f/u appt already scheduled for next week   Strict precautions reviewed

## 2022-11-30 ENCOUNTER — ULTRASOUND (OUTPATIENT)
Dept: OBGYN CLINIC | Facility: CLINIC | Age: 37
End: 2022-11-30

## 2022-11-30 VITALS — WEIGHT: 118 LBS | SYSTOLIC BLOOD PRESSURE: 108 MMHG | BODY MASS INDEX: 20.25 KG/M2 | DIASTOLIC BLOOD PRESSURE: 66 MMHG

## 2022-11-30 DIAGNOSIS — O03.9 COMPLETE MISCARRIAGE: Primary | ICD-10-CM

## 2022-11-30 NOTE — PROGRESS NOTES
Subjective   Patient ID: Stephie Borrego is a 40 y o  female  Patient is here for a problem visit  Chief Complaint   Patient presents with   • Gynecologic Exam     Ultrasound  Bleeding stopped  Last seen  with likely complete AB of 5-6 week pregnancy   TVUS at that visit without evidence of retained POC, EMS 7 6 mm   Had light bleeding for a few more days which stopped  Had cramping a few more days which stopped   No fevers/chills     Last routine GYN exam was postpartum, in 2018  Heavy bleeding for third day of cycle, also painful, cycle lasts 5-6 days   Typically regular cycles     Pt reports grandmother with excessive bleeding requiring hysterectomy but no known FH bleeding disorders  Pt denies excessive bleeding/bruising, no PPH with delivery       Menstrual History:  OB History        4    Para   1    Term   1            AB   3    Living   1       SAB   1    IAB        Ectopic        Multiple        Live Births                    Patient's last menstrual period was 10/24/2022 (approximate)  Past Medical History:   Diagnosis Date   • Anxiety    • Bulimia nervosa     per [employer]   • Right clavicle fracture        Past Surgical History:   Procedure Laterality Date   • ELBOW SURGERY     • FINGER SURGERY Right     4th finger       Social History     Tobacco Use   • Smoking status: Every Day     Packs/day: 0 50     Types: Cigarettes   • Smokeless tobacco: Never   Vaping Use   • Vaping Use: Never used   Substance Use Topics   • Alcohol use:  Yes     Alcohol/week: 14 0 standard drinks     Types: 14 Glasses of wine per week     Comment: occasional   • Drug use: Never        Allergies   Allergen Reactions   • Codeine Anaphylaxis         Current Outpatient Medications:   •  DULoxetine (Cymbalta) 30 mg delayed release capsule, Take 1 capsule (30 mg total) by mouth daily (Patient not taking: Reported on 2022), Disp: 30 capsule, Rfl: 2      Review of Systems Constitutional: Negative for chills and fever  Eyes: Negative for visual disturbance  Respiratory: Negative for chest tightness and shortness of breath  Cardiovascular: Negative for chest pain  Gastrointestinal: Negative for abdominal pain, diarrhea, nausea and vomiting  Genitourinary: Negative for pelvic pain and vaginal bleeding  As noted in HPI   Skin: Negative for rash  Neurological: Negative for headaches  All other systems reviewed and are negative  /66 (BP Location: Right arm, Patient Position: Sitting, Cuff Size: Adult)   Wt 53 5 kg (118 lb)   LMP 10/24/2022 (Approximate)   Breastfeeding Unknown   BMI 20 25 kg/m²       Physical Exam  Constitutional:       General: She is not in acute distress  Appearance: Normal appearance  Genitourinary:      Right Labia: No rash, tenderness, lesions or skin changes  Left Labia: No tenderness, lesions, skin changes or rash  Pelvic exam was performed with patient in the lithotomy position  HENT:      Head: Normocephalic and atraumatic  Cardiovascular:      Rate and Rhythm: Normal rate  Pulmonary:      Effort: Pulmonary effort is normal  No respiratory distress  Abdominal:      General: There is no distension  Palpations: Abdomen is soft  Tenderness: There is no abdominal tenderness  There is no guarding or rebound  Neurological:      General: No focal deficit present  Mental Status: She is alert  Psychiatric:         Mood and Affect: Mood normal          Behavior: Behavior normal    Vitals and nursing note reviewed         Limited transvaginal ultrasound with thin endometrial stripe, 5 6 mm, no evidence of retained products of conception         Appropriate laboratory testing, imaging studies, and prior external records were reviewed:     Assessment/Plan:       Problem List Items Addressed This Visit        Other    Complete miscarriage - Primary     No concern for retained products based on history and US  Pregnancy was "somewhat planned," she is not interested in contraception at this time  Reviewed cycle likely to return in next few weeks   Advised f/u for annual exam in next few months as she is overdue for routine care

## 2022-11-30 NOTE — ASSESSMENT & PLAN NOTE
No concern for retained products based on history and US  Pregnancy was "somewhat planned," she is not interested in contraception at this time  Reviewed cycle likely to return in next few weeks   Advised f/u for annual exam in next few months as she is overdue for routine care

## 2023-07-20 ENCOUNTER — HOSPITAL ENCOUNTER (EMERGENCY)
Facility: HOSPITAL | Age: 38
Discharge: HOME | End: 2023-07-20
Attending: EMERGENCY MEDICINE
Payer: COMMERCIAL

## 2023-07-20 ENCOUNTER — APPOINTMENT (EMERGENCY)
Dept: RADIOLOGY | Facility: HOSPITAL | Age: 38
End: 2023-07-20
Payer: COMMERCIAL

## 2023-07-20 VITALS
HEART RATE: 68 BPM | SYSTOLIC BLOOD PRESSURE: 119 MMHG | BODY MASS INDEX: 19.45 KG/M2 | OXYGEN SATURATION: 99 % | TEMPERATURE: 97.9 F | DIASTOLIC BLOOD PRESSURE: 75 MMHG | WEIGHT: 113.32 LBS | RESPIRATION RATE: 22 BRPM

## 2023-07-20 DIAGNOSIS — F10.20 ALCOHOL DEPENDENCE (HCC): ICD-10-CM

## 2023-07-20 DIAGNOSIS — M54.9 BACK PAIN: ICD-10-CM

## 2023-07-20 DIAGNOSIS — R07.9 CHEST PAIN OF UNCERTAIN ETIOLOGY: Primary | ICD-10-CM

## 2023-07-20 LAB
ALBUMIN SERPL BCP-MCNC: 4.5 G/DL (ref 3.5–5)
ALP SERPL-CCNC: 47 U/L (ref 34–104)
ALT SERPL W P-5'-P-CCNC: 17 U/L (ref 7–52)
ANION GAP SERPL CALCULATED.3IONS-SCNC: 10 MMOL/L
AST SERPL W P-5'-P-CCNC: 26 U/L (ref 13–39)
ATRIAL RATE: 163 BPM
BACTERIA UR QL AUTO: NORMAL /HPF
BASOPHILS # BLD AUTO: 0.04 THOUSANDS/ÂΜL (ref 0–0.1)
BASOPHILS NFR BLD AUTO: 1 % (ref 0–1)
BILIRUB SERPL-MCNC: 1.62 MG/DL (ref 0.2–1)
BILIRUB UR QL STRIP: NEGATIVE
BUN SERPL-MCNC: 9 MG/DL (ref 5–25)
CALCIUM SERPL-MCNC: 9.9 MG/DL (ref 8.4–10.2)
CARDIAC TROPONIN I PNL SERPL HS: <2 NG/L
CHLORIDE SERPL-SCNC: 101 MMOL/L (ref 96–108)
CLARITY UR: CLEAR
CO2 SERPL-SCNC: 25 MMOL/L (ref 21–32)
COLOR UR: YELLOW
CREAT SERPL-MCNC: 0.62 MG/DL (ref 0.6–1.3)
EOSINOPHIL # BLD AUTO: 0.21 THOUSAND/ÂΜL (ref 0–0.61)
EOSINOPHIL NFR BLD AUTO: 4 % (ref 0–6)
ERYTHROCYTE [DISTWIDTH] IN BLOOD BY AUTOMATED COUNT: 12.1 % (ref 11.6–15.1)
GFR SERPL CREATININE-BSD FRML MDRD: 114 ML/MIN/1.73SQ M
GLUCOSE SERPL-MCNC: 91 MG/DL (ref 65–140)
GLUCOSE UR STRIP-MCNC: NEGATIVE MG/DL
HCT VFR BLD AUTO: 39.8 % (ref 34.8–46.1)
HGB BLD-MCNC: 12.7 G/DL (ref 11.5–15.4)
HGB UR QL STRIP.AUTO: ABNORMAL
IMM GRANULOCYTES # BLD AUTO: 0.01 THOUSAND/UL (ref 0–0.2)
IMM GRANULOCYTES NFR BLD AUTO: 0 % (ref 0–2)
KETONES UR STRIP-MCNC: ABNORMAL MG/DL
LEUKOCYTE ESTERASE UR QL STRIP: NEGATIVE
LYMPHOCYTES # BLD AUTO: 1.25 THOUSANDS/ÂΜL (ref 0.6–4.47)
LYMPHOCYTES NFR BLD AUTO: 26 % (ref 14–44)
MCH RBC QN AUTO: 33.2 PG (ref 26.8–34.3)
MCHC RBC AUTO-ENTMCNC: 31.9 G/DL (ref 31.4–37.4)
MCV RBC AUTO: 104 FL (ref 82–98)
MONOCYTES # BLD AUTO: 0.34 THOUSAND/ÂΜL (ref 0.17–1.22)
MONOCYTES NFR BLD AUTO: 7 % (ref 4–12)
NEUTROPHILS # BLD AUTO: 2.95 THOUSANDS/ÂΜL (ref 1.85–7.62)
NEUTS SEG NFR BLD AUTO: 62 % (ref 43–75)
NITRITE UR QL STRIP: NEGATIVE
NON-SQ EPI CELLS URNS QL MICRO: NORMAL /HPF
NRBC BLD AUTO-RTO: 0 /100 WBCS
P AXIS: 87 DEGREES
PH UR STRIP.AUTO: 6.5 [PH] (ref 4.5–8)
PLATELET # BLD AUTO: 260 THOUSANDS/UL (ref 149–390)
PMV BLD AUTO: 9.4 FL (ref 8.9–12.7)
POTASSIUM SERPL-SCNC: 3.8 MMOL/L (ref 3.5–5.3)
PROT SERPL-MCNC: 7.4 G/DL (ref 6.4–8.4)
PROT UR STRIP-MCNC: NEGATIVE MG/DL
QRS AXIS: 95 DEGREES
QRSD INTERVAL: 84 MS
QT INTERVAL: 366 MS
QTC INTERVAL: 417 MS
RBC # BLD AUTO: 3.83 MILLION/UL (ref 3.81–5.12)
RBC #/AREA URNS AUTO: NORMAL /HPF
SODIUM SERPL-SCNC: 136 MMOL/L (ref 135–147)
SP GR UR STRIP.AUTO: 1.02 (ref 1–1.03)
T WAVE AXIS: 78 DEGREES
UROBILINOGEN UR QL STRIP.AUTO: 0.2 E.U./DL
VENTRICULAR RATE: 78 BPM
WBC # BLD AUTO: 4.8 THOUSAND/UL (ref 4.31–10.16)
WBC #/AREA URNS AUTO: NORMAL /HPF

## 2023-07-20 PROCEDURE — 96367 TX/PROPH/DG ADDL SEQ IV INF: CPT

## 2023-07-20 PROCEDURE — 81001 URINALYSIS AUTO W/SCOPE: CPT

## 2023-07-20 PROCEDURE — 84484 ASSAY OF TROPONIN QUANT: CPT | Performed by: EMERGENCY MEDICINE

## 2023-07-20 PROCEDURE — 99285 EMERGENCY DEPT VISIT HI MDM: CPT | Performed by: EMERGENCY MEDICINE

## 2023-07-20 PROCEDURE — 99285 EMERGENCY DEPT VISIT HI MDM: CPT

## 2023-07-20 PROCEDURE — 80053 COMPREHEN METABOLIC PANEL: CPT | Performed by: EMERGENCY MEDICINE

## 2023-07-20 PROCEDURE — 96375 TX/PRO/DX INJ NEW DRUG ADDON: CPT

## 2023-07-20 PROCEDURE — 93005 ELECTROCARDIOGRAM TRACING: CPT

## 2023-07-20 PROCEDURE — 85025 COMPLETE CBC W/AUTO DIFF WBC: CPT | Performed by: EMERGENCY MEDICINE

## 2023-07-20 PROCEDURE — 96365 THER/PROPH/DIAG IV INF INIT: CPT

## 2023-07-20 PROCEDURE — 71046 X-RAY EXAM CHEST 2 VIEWS: CPT

## 2023-07-20 PROCEDURE — 93010 ELECTROCARDIOGRAM REPORT: CPT | Performed by: INTERNAL MEDICINE

## 2023-07-20 PROCEDURE — 36415 COLL VENOUS BLD VENIPUNCTURE: CPT | Performed by: EMERGENCY MEDICINE

## 2023-07-20 RX ORDER — METHOCARBAMOL 750 MG/1
750 TABLET, FILM COATED ORAL 3 TIMES DAILY PRN
Qty: 42 TABLET | Refills: 0 | Status: SHIPPED | OUTPATIENT
Start: 2023-07-20

## 2023-07-20 RX ORDER — KETOROLAC TROMETHAMINE 30 MG/ML
15 INJECTION, SOLUTION INTRAMUSCULAR; INTRAVENOUS ONCE
Status: COMPLETED | OUTPATIENT
Start: 2023-07-20 | End: 2023-07-20

## 2023-07-20 RX ORDER — NAPROXEN 500 MG/1
500 TABLET ORAL 2 TIMES DAILY PRN
Qty: 14 TABLET | Refills: 0 | Status: SHIPPED | OUTPATIENT
Start: 2023-07-20

## 2023-07-20 RX ORDER — LIDOCAINE 50 MG/G
1 PATCH TOPICAL EVERY 24 HOURS
Qty: 6 PATCH | Refills: 0 | Status: SHIPPED | OUTPATIENT
Start: 2023-07-20

## 2023-07-20 RX ADMIN — KETOROLAC TROMETHAMINE 15 MG: 30 INJECTION, SOLUTION INTRAMUSCULAR; INTRAVENOUS at 12:39

## 2023-07-20 RX ADMIN — DEXTROSE, SODIUM CHLORIDE, SODIUM LACTATE, POTASSIUM CHLORIDE, AND CALCIUM CHLORIDE 1000 ML: 5; .6; .31; .03; .02 INJECTION, SOLUTION INTRAVENOUS at 12:21

## 2023-07-20 RX ADMIN — FOLIC ACID: 5 INJECTION, SOLUTION INTRAMUSCULAR; INTRAVENOUS; SUBCUTANEOUS at 13:03

## 2023-07-20 NOTE — Clinical Note
Jayden Lemus was seen and treated in our emergency department on 7/20/2023. No restrictions            Diagnosis:     Casey Pace  may return to work on return date, is off the rest of the shift today. She may return on this date: 07/21/2023         If you have any questions or concerns, please don't hesitate to call.       David Ang RN    ______________________________           _______________          _______________  Hospital Representative                              Date                                Time

## 2023-07-20 NOTE — ED PROVIDER NOTES
History  Chief Complaint   Patient presents with   • Chest Pain     Pt reports chest pain radiating to the back since yesterday. Pt also reports SOB, and left arm numbness. Pt also reports she has been drinking about 5 beers daily for the past two months and has not had any alcohol in 48 hrs. 44 yo F c/o onset of mid back and chest pain yesterday morning, without fever, chills, associated with some reproducible component, and increased with deep breath. Pain has been persistent since onset. She went to [facility] yesterday, and was advised to go to an ED for additional eval, but she opted not to, citing a normal EKG, and thought maybe it would go away. However, today, she still has back and chest pain. Chest pain has been a longstanding recurrent issue for her, that she has been through multiple evaluations, including stress tests, holter, and never diagnosed with a cardiac condition. Secondarily, she was concerned about alcohol intake, having increased to daily use up to 5-6 beers for two months due to social stressors, but wants to stop drinking, and has not drank for two days. She has gone through alcohol treatment years ago, but considered her drinking controlled until the past several months, and especially the last two. She has difficulty quitting drinking because she feels the urge to drink after abstinence. History provided by:  Patient  Chest Pain      Prior to Admission Medications   Prescriptions Last Dose Informant Patient Reported? Taking?    DULoxetine (Cymbalta) 30 mg delayed release capsule Not Taking Self No No   Sig: Take 1 capsule (30 mg total) by mouth daily   Patient not taking: Reported on 11/18/2022      Facility-Administered Medications: None       Past Medical History:   Diagnosis Date   • Anxiety    • Bulimia nervosa     per [employer]   • Right clavicle fracture        Past Surgical History:   Procedure Laterality Date   • ELBOW SURGERY     • FINGER SURGERY Right 4th finger       History reviewed. No pertinent family history. I have reviewed and agree with the history as documented. E-Cigarette/Vaping   • E-Cigarette Use Never User      E-Cigarette/Vaping Substances   • Nicotine No    • THC No    • CBD No    • Flavoring No    • Other No    • Unknown No      Social History     Tobacco Use   • Smoking status: Every Day     Packs/day: 0.50     Types: Cigarettes   • Smokeless tobacco: Never   Vaping Use   • Vaping Use: Never used   Substance Use Topics   • Alcohol use: Not Currently     Alcohol/week: 35.0 standard drinks of alcohol     Types: 35 Cans of beer per week   • Drug use: Never       Review of Systems   Cardiovascular: Positive for chest pain. Physical Exam  Physical Exam  Vitals and nursing note reviewed. Constitutional:       Appearance: She is well-developed. She is not toxic-appearing or diaphoretic. HENT:      Head: Normocephalic and atraumatic. Right Ear: Tympanic membrane and external ear normal.      Left Ear: Tympanic membrane and external ear normal.      Nose: Nose normal.   Eyes:      Conjunctiva/sclera: Conjunctivae normal.      Pupils: Pupils are equal, round, and reactive to light. Neck:      Meningeal: Brudzinski's sign and Kernig's sign absent. Cardiovascular:      Rate and Rhythm: Normal rate and regular rhythm. Pulses: Normal pulses. Heart sounds: Normal heart sounds. No murmur heard. Pulmonary:      Effort: Pulmonary effort is normal. No tachypnea or respiratory distress. Breath sounds: Normal breath sounds. No wheezing. Abdominal:      General: Bowel sounds are normal. There is no distension. Palpations: Abdomen is soft. Abdomen is not rigid. Tenderness: There is no abdominal tenderness. There is no guarding or rebound. Musculoskeletal:         General: Normal range of motion. Cervical back: Full passive range of motion without pain, normal range of motion and neck supple.       Right lower leg: No swelling. Left lower leg: No swelling. Lymphadenopathy:      Cervical: No cervical adenopathy. Skin:     General: Skin is warm and dry. Coloration: Skin is not pale. Findings: No rash. Neurological:      Mental Status: She is alert and oriented to person, place, and time. GCS: GCS eye subscore is 4. GCS verbal subscore is 5. GCS motor subscore is 6. Cranial Nerves: No cranial nerve deficit. Sensory: No sensory deficit. Coordination: Coordination normal.      Gait: Gait normal.      Deep Tendon Reflexes: Reflexes are normal and symmetric. Psychiatric:         Speech: Speech normal.         Behavior: Behavior normal.         Thought Content:  Thought content normal.         Judgment: Judgment normal.         Vital Signs  ED Triage Vitals   Temperature Pulse Respirations Blood Pressure SpO2   07/20/23 1148 07/20/23 1138 07/20/23 1138 07/20/23 1138 07/20/23 1138   97.9 °F (36.6 °C) 81 16 148/93 100 %      Temp Source Heart Rate Source Patient Position - Orthostatic VS BP Location FiO2 (%)   07/20/23 1148 07/20/23 1138 07/20/23 1138 07/20/23 1138 --   Oral Monitor Sitting Right arm       Pain Score       07/20/23 1138       8           Vitals:    07/20/23 1138 07/20/23 1307 07/20/23 1310 07/20/23 1345   BP: 148/93 131/75 131/75 119/75   Pulse: 81 76 76 68   Patient Position - Orthostatic VS: Sitting Lying           Visual Acuity      ED Medications  Medications   dextrose 5% lactated Ringer's bolus 1,000 mL (0 mL Intravenous Stopped 7/20/23 1405)   ketorolac (TORADOL) injection 15 mg (15 mg Intravenous Given 7/20/23 1239)       Diagnostic Studies  Results Reviewed     Procedure Component Value Units Date/Time    HS Troponin 0hr (reflex protocol) [[id number]]  (Normal) Collected: 07/20/23 1150    Lab Status: Final result Specimen: Blood from Arm, Left Updated: 07/20/23 1314     hs TnI 0hr <2 ng/L     Comprehensive metabolic panel [[id number]]  (Abnormal) Collected: 07/20/23 1150    Lab Status: Final result Specimen: Blood from Arm, Left Updated: 07/20/23 1306     Sodium 136 mmol/L      Potassium 3.8 mmol/L      Chloride 101 mmol/L      CO2 25 mmol/L      ANION GAP 10 mmol/L      BUN 9 mg/dL      Creatinine 0.62 mg/dL      Glucose 91 mg/dL      Calcium 9.9 mg/dL      AST 26 U/L      ALT 17 U/L      Alkaline Phosphatase 47 U/L      Total Protein 7.4 g/dL      Albumin 4.5 g/dL      Total Bilirubin 1.62 mg/dL      eGFR 114 ml/min/1.73sq m     Narrative:      National Kidney Disease Foundation guidelines for Chronic Kidney Disease (CKD):   •  Stage 1 with normal or high GFR (GFR > 90 mL/min/1.73 square meters)  •  Stage 2 Mild CKD (GFR = 60-89 mL/min/1.73 square meters)  •  Stage 3A Moderate CKD (GFR = 45-59 mL/min/1.73 square meters)  •  Stage 3B Moderate CKD (GFR = 30-44 mL/min/1.73 square meters)  •  Stage 4 Severe CKD (GFR = 15-29 mL/min/1.73 square meters)  •  Stage 5 End Stage CKD (GFR <15 mL/min/1.73 square meters)  Note: GFR calculation is accurate only with a steady state creatinine    Urine Microscopic [[id number]]  (Normal) Collected: 07/20/23 1221    Lab Status: Final result Specimen: Urine, Clean Catch Updated: 07/20/23 1236     RBC, UA 1-2 /hpf      WBC, UA 1-2 /hpf      Epithelial Cells Occasional /hpf      Bacteria, UA Occasional /hpf     CBC and differential [[id number]]  (Abnormal) Collected: 07/20/23 1150    Lab Status: Final result Specimen: Blood from Arm, Left Updated: 07/20/23 1227     WBC 4.80 Thousand/uL      RBC 3.83 Million/uL      Hemoglobin 12.7 g/dL      Hematocrit 39.8 %       fL      MCH 33.2 pg      MCHC 31.9 g/dL      RDW 12.1 %      MPV 9.4 fL      Platelets 318 Thousands/uL      nRBC 0 /100 WBCs      Neutrophils Relative 62 %      Immat GRANS % 0 %      Lymphocytes Relative 26 %      Monocytes Relative 7 %      Eosinophils Relative 4 %      Basophils Relative 1 %      Neutrophils Absolute 2.95 Thousands/µL      Immature Grans Absolute 0.01 Thousand/uL      Lymphocytes Absolute 1.25 Thousands/µL      Monocytes Absolute 0.34 Thousand/µL      Eosinophils Absolute 0.21 Thousand/µL      Basophils Absolute 0.04 Thousands/µL     Urine Macroscopic, POC [[id number]]  (Abnormal) Collected: 07/20/23 1221    Lab Status: Final result Specimen: Urine Updated: 07/20/23 1222     Color, UA Yellow     Clarity, UA Clear     pH, UA 6.5     Leukocytes, UA Negative     Nitrite, UA Negative     Protein, UA Negative mg/dl      Glucose, UA Negative mg/dl      Ketones, UA 40 (2+) mg/dl      Urobilinogen, UA 0.2 E.U./dl      Bilirubin, UA Negative     Occult Blood, UA Trace     Specific Gravity, UA 1.020    Narrative:      CLINITEK RESULT                 XR chest 2 views   Final Result by Douglas Panda MD (07/20 1351)      No acute cardiopulmonary disease. Workstation performed: [id number]                    Procedures  ECG 12 Lead Documentation Only    Date/Time: 7/20/2023 11:40 AM    Performed by: Bill Martins MD  Authorized by: Bill Martins MD    Rate:     ECG rate:  78  Rhythm:     Rhythm: sinus rhythm    Ectopy:     Ectopy: none    QRS:     QRS axis:  Normal    QRS intervals:  Normal  Conduction:     Conduction: normal    ST segments:     ST segments:  Normal  T waves:     T waves: normal               ED Course  ED Course as of 07/20/23 1818   Thu Jul 20, 2023   1309 XR chest 2 views  My independent interpretation of imaging:   No acute findings      1316 hs TnI 0hr: <2  negative, discomfort since yesterday, no ongoing concern for cardiac ischemia   1330 Reviewed results with patient at bedside and updated on the plan. ED workup is reassuring. Pain localizing to left paraspinous inferior scapula area. We discussed the alcohol issues, and she was able to speak with ANA Deleon. I offered the detox unit, but she is opting to try outpatient through [facility], which is reasonable.                                  SBIRT 22yo+ Flowsheet Row Most Recent Value   Initial Alcohol Screen: US AUDIT-C     1. How often do you have a drink containing alcohol? 6 Filed at: 07/20/2023 1141   2. How many drinks containing alcohol do you have on a typical day you are drinking? 4 Filed at: 07/20/2023 1141   3b. FEMALE Any Age, or MALE 65+: How often do you have 4 or more drinks on one occassion? 6 Filed at: 07/20/2023 1141   Audit-C Score 16 Filed at: 07/20/2023 1141   Full Alcohol Screen: US AUDIT    4. How often during the last year have you found that you were not able to stop drinking once you had started? 2 Filed at: 07/20/2023 1141   5. How often during past year have you failed to do what was normally expected of you because of drinking? 1 Filed at: 07/20/2023 1141   6. How often in past year have you needed a first drink in the morning to get yourself going after a heavy drinking session? 1 Filed at: 07/20/2023 1141   7. How often in past year have you had feeling of guilt or remorse after drinking? 3 Filed at: 07/20/2023 1141   8. How often in past year have you been unable to remember what happened night before because you had been drinking? 0 Filed at: 07/20/2023 1141   9. Have you or someone else been injured as a result of your drinking? 4 Filed at: 07/20/2023 1141   10. Has a relative, friend, doctor or other health worker been concerned about your drinking and suggested you cut down?  0 Filed at: 07/20/2023 1141   AUDIT Total Score 27 Filed at: 07/20/2023 1141   SUNNY: How many times in the past year have you. .. Used an illegal drug or used a prescription medication for non-medical reasons? Never Filed at: 07/20/2023 1141                    Medical Decision Making  Amount and/or Complexity of Data Reviewed  Labs: ordered. Decision-making details documented in ED Course. Radiology: ordered. Decision-making details documented in ED Course. Risk  Prescription drug management.           Disposition  Final diagnoses:   Chest pain of uncertain etiology   Back pain   Alcohol dependence ([address])     Time reflects when diagnosis was documented in both MDM as applicable and the Disposition within this note     Time User Action Codes Description Comment    7/20/2023  1:33 PM Reford [place] Add [R07.9] Chest pain of uncertain etiology     7/20/2023  1:33 PM Reford [place] Add [M54.9] Back pain     7/20/2023  1:34 PM Reford [place] Add [F10.20] Alcohol dependence [facility])       ED Disposition     ED Disposition   Discharge    Condition   Good    Date/Time   Thu Jul 20, 2023  1:33 PM    Comment   Francieon Brian discharge to home/self care. Follow-up Information     Follow up With Specialties Details Why Contact Info    Tye Alicea MD Family Medicine Call  For followup [address]  [facility] [id number] [phone]            Discharge Medication List as of 7/20/2023  1:37 PM      START taking these medications    Details   lidocaine (LIDODERM) 5 % Apply 1 patch topically over 12 hours every 24 hours Remove & Discard patch within 12 hours or as directed by MD, Starting Thu 7/20/2023, Normal      methocarbamol (ROBAXIN) 750 mg tablet Take 1 tablet (750 mg total) by mouth 3 (three) times a day as needed for muscle spasms, Starting Thu 7/20/2023, Normal      naproxen (NAPROSYN) 500 mg tablet Take 1 tablet (500 mg total) by mouth 2 (two) times a day as needed for mild pain or moderate pain for up to 14 doses, Starting Thu 7/20/2023, Normal         CONTINUE these medications which have NOT CHANGED    Details   DULoxetine (Cymbalta) 30 mg delayed release capsule Take 1 capsule (30 mg total) by mouth daily, Starting Fri 10/7/2022, Normal             No discharge procedures on file.     PDMP Review     None          ED Provider  Electronically Signed by           Amandeep Craft MD  07/20/23 879 Christin Livingston MD  07/20/23 3249

## 2023-07-20 NOTE — ED CARE HANDOFF
Reaching out to [facility] re: pt admit. Received [place] Text from [facility] stating that pt is discharging and prefers contact via phone. Will give [facility] pt phone number.

## 2023-07-20 NOTE — CERTIFIED RECOVERY SPECIALIST
Certified  Note    Patient name: Jeet Fonsecapool  Location: ED-42/ED-42  [place]: [address] [facility]  Attending:  David Zamudio MD MRN [id number]  : 1985  Age: 45 y.o. Sex: female Date 2023         Substance Use History:     Social History     Substance and Sexual Activity   Alcohol Use Not Currently   • Alcohol/week: 35.0 standard drinks of alcohol   • Types: 35 Cans of beer per week        Social History     Substance and Sexual Activity   Drug Use Never       Admission Information  Substances Used at This Admission[de-identified] Alcohol  Readmission in Last 30 Days?: No  Encounter Type[de-identified] Patient Face-to-Face    Recovery Support Plan  Declined All Services?: No  Medication Assisted Treatment[de-identified] No  Agreeable to Warm Handoff?: Yes  Is Patient Accepting [facility] Treatment Services?: No  Was Referral Made to [address] [place] [address]?: No  Was Narcan Provided at Discharge?: No  Plan Discussed With Treatment Team[de-identified] Yes  Plan Discussed With[de-identified] Provider    Referral to Recovery Supports:  [address][de-identified] No  Community Based CRS[de-identified] No  Case Management[de-identified] No  Direct Access to [facility] Treatment?: No  Resource Guide Given?: Yes  Follow Up With Patient[de-identified] Yes (Ongoing)  Family / Other Support[de-identified] No  Referral for Community Physical Health[de-identified] No  Referral for Community Mental Health[de-identified] No'    CRS received consult to meet with patient. CRS provided introductions and explanation of service. CRS pursued patient surrounding needs. Patient shared about her alcohol use, instances of limiting use, and recurrence. CRS pursued patient surrounding internal strengths that helped during times when she was motivated to limit alcohol consumption. Patient shared that she wants to be present for her son and that she fears that she continues, she will die. CRS utilized self-disclosure and summarization to demonstrate understanding and empathy.       CRS shared the benefits of a warm handoff, providing explanation of service. Patient agreeable.         Stefani Ivey

## 2023-07-20 NOTE — DISCHARGE INSTRUCTIONS
Follow up with your regular doctor and continue your medications as prescribed. Please return if you are feeling overwhelmed or if you feel unsafe toward yourself or others. Chest Pain  GENERAL INFORMATION:   Chest pain  can be caused by a range of conditions, from not serious to life-threatening. It may be caused by a heart attack or a blood clot in your lungs. Sometimes chest pain or pressure is caused by poor blood flow to your heart (angina). Infection, inflammation, or a fracture in the bones or cartilage in your chest can cause pain or discomfort. Chest pain can also be a symptom of a digestive problem, such as acid reflux or a stomach ulcer. Common symptoms include the following:   Fever or sweating     Nausea or vomiting     Shortness of breath     Discomfort or pressure that spreads from your chest to your back, jaw, or arm     A racing or slow heartbeat     Feeling weak, tired, or faint  Seek immediate care for the following symptoms:   Any of the following signs of a heart attack:      Squeezing, pressure, or pain in your chest that lasts longer than 5 minutes or returns    Discomfort or pain in your back, neck, jaw, stomach, or arm     Trouble breathing     Nausea or vomiting    Lightheadedness or a sudden cold sweat, especially with trouble breathing         Chest discomfort that gets worse, even with medicine    Coughing or vomiting blood    Black or bloody bowel movements     Vomiting that does not stop, or pain when you swallow  Do not smoke: If you smoke, it is never too late to quit. Smoking increases your risk for a heart attack and other heart and lung conditions. Ask your healthcare provider for information about how to stop smoking if you need help.

## 2023-08-31 ENCOUNTER — TELEPHONE (OUTPATIENT)
Dept: OTHER | Age: 38
End: 2023-08-31

## 2023-08-31 NOTE — TELEPHONE ENCOUNTER
Received a staff message from Bety Ha for a new patient referral to the [facility] via the [facility] web site. It stated that the pt is interested in detox for her alcohol abuse. Attempt made to contact the patient but there was no answer and the VM is full. Will attempt to call back at a later time.

## 2024-01-04 ENCOUNTER — HOSPITAL ENCOUNTER (EMERGENCY)
Facility: HOSPITAL | Age: 39
Discharge: HOME | End: 2024-01-04
Attending: EMERGENCY MEDICINE
Payer: COMMERCIAL

## 2024-01-04 ENCOUNTER — APPOINTMENT (EMERGENCY)
Dept: CT IMAGING | Facility: HOSPITAL | Age: 39
End: 2024-01-04
Payer: COMMERCIAL

## 2024-01-04 VITALS
BODY MASS INDEX: 19.9 KG/M2 | SYSTOLIC BLOOD PRESSURE: 97 MMHG | WEIGHT: 115.96 LBS | TEMPERATURE: 98.4 F | HEART RATE: 91 BPM | DIASTOLIC BLOOD PRESSURE: 67 MMHG | RESPIRATION RATE: 18 BRPM | OXYGEN SATURATION: 100 %

## 2024-01-04 DIAGNOSIS — R10.9 RIGHT SIDED ABDOMINAL PAIN: Primary | ICD-10-CM

## 2024-01-04 DIAGNOSIS — M54.50 RIGHT LOW BACK PAIN: ICD-10-CM

## 2024-01-04 LAB
ALBUMIN SERPL BCP-MCNC: 4.1 G/DL (ref 3.5–5)
ALP SERPL-CCNC: 38 U/L (ref 34–104)
ALT SERPL W P-5'-P-CCNC: 10 U/L (ref 7–52)
ANION GAP SERPL CALCULATED.3IONS-SCNC: 6 MMOL/L
AST SERPL W P-5'-P-CCNC: 14 U/L (ref 13–39)
BACTERIA UR QL AUTO: NORMAL /HPF
BASOPHILS # BLD AUTO: 0.03 THOUSANDS/ÂΜL (ref 0–0.1)
BASOPHILS NFR BLD AUTO: 0 % (ref 0–1)
BILIRUB DIRECT SERPL-MCNC: 0.05 MG/DL (ref 0–0.2)
BILIRUB SERPL-MCNC: 0.27 MG/DL (ref 0.2–1)
BILIRUB UR QL STRIP: NEGATIVE
BUN SERPL-MCNC: 6 MG/DL (ref 5–25)
CALCIUM SERPL-MCNC: 8.9 MG/DL (ref 8.4–10.2)
CHLORIDE SERPL-SCNC: 106 MMOL/L (ref 96–108)
CLARITY UR: CLEAR
CO2 SERPL-SCNC: 25 MMOL/L (ref 21–32)
COLOR UR: YELLOW
CREAT SERPL-MCNC: 0.7 MG/DL (ref 0.6–1.3)
EOSINOPHIL # BLD AUTO: 0.24 THOUSAND/ÂΜL (ref 0–0.61)
EOSINOPHIL NFR BLD AUTO: 3 % (ref 0–6)
ERYTHROCYTE [DISTWIDTH] IN BLOOD BY AUTOMATED COUNT: 12.7 % (ref 11.6–15.1)
EXT PREGNANCY TEST URINE: NEGATIVE
EXT. CONTROL: NORMAL
GFR SERPL CREATININE-BSD FRML MDRD: 110 ML/MIN/1.73SQ M
GLUCOSE SERPL-MCNC: 105 MG/DL (ref 65–140)
GLUCOSE UR STRIP-MCNC: NEGATIVE MG/DL
HCT VFR BLD AUTO: 34 % (ref 34.8–46.1)
HGB BLD-MCNC: 11 G/DL (ref 11.5–15.4)
HGB UR QL STRIP.AUTO: ABNORMAL
IMM GRANULOCYTES # BLD AUTO: 0.03 THOUSAND/UL (ref 0–0.2)
IMM GRANULOCYTES NFR BLD AUTO: 0 % (ref 0–2)
KETONES UR STRIP-MCNC: NEGATIVE MG/DL
LEUKOCYTE ESTERASE UR QL STRIP: ABNORMAL
LIPASE SERPL-CCNC: 19 U/L (ref 11–82)
LYMPHOCYTES # BLD AUTO: 1.79 THOUSANDS/ÂΜL (ref 0.6–4.47)
LYMPHOCYTES NFR BLD AUTO: 20 % (ref 14–44)
MCH RBC QN AUTO: 34.1 PG (ref 26.8–34.3)
MCHC RBC AUTO-ENTMCNC: 32.4 G/DL (ref 31.4–37.4)
MCV RBC AUTO: 105 FL (ref 82–98)
MONOCYTES # BLD AUTO: 0.49 THOUSAND/ÂΜL (ref 0.17–1.22)
MONOCYTES NFR BLD AUTO: 6 % (ref 4–12)
NEUTROPHILS # BLD AUTO: 6.21 THOUSANDS/ÂΜL (ref 1.85–7.62)
NEUTS SEG NFR BLD AUTO: 71 % (ref 43–75)
NITRITE UR QL STRIP: NEGATIVE
NON-SQ EPI CELLS URNS QL MICRO: NORMAL /HPF
NRBC BLD AUTO-RTO: 0 /100 WBCS
PH UR STRIP.AUTO: 5.5 [PH] (ref 4.5–8)
PLATELET # BLD AUTO: 204 THOUSANDS/UL (ref 149–390)
PMV BLD AUTO: 9.9 FL (ref 8.9–12.7)
POTASSIUM SERPL-SCNC: 3.6 MMOL/L (ref 3.5–5.3)
PROT SERPL-MCNC: 6.6 G/DL (ref 6.4–8.4)
PROT UR STRIP-MCNC: NEGATIVE MG/DL
RBC # BLD AUTO: 3.23 MILLION/UL (ref 3.81–5.12)
RBC #/AREA URNS AUTO: NORMAL /HPF
SODIUM SERPL-SCNC: 137 MMOL/L (ref 135–147)
SP GR UR STRIP.AUTO: 1.01 (ref 1–1.03)
UROBILINOGEN UR QL STRIP.AUTO: 0.2 E.U./DL
WBC # BLD AUTO: 8.79 THOUSAND/UL (ref 4.31–10.16)
WBC #/AREA URNS AUTO: NORMAL /HPF

## 2024-01-04 PROCEDURE — 99284 EMERGENCY DEPT VISIT MOD MDM: CPT

## 2024-01-04 PROCEDURE — 99285 EMERGENCY DEPT VISIT HI MDM: CPT

## 2024-01-04 PROCEDURE — 96374 THER/PROPH/DIAG INJ IV PUSH: CPT

## 2024-01-04 PROCEDURE — 74177 CT ABD & PELVIS W/CONTRAST: CPT

## 2024-01-04 PROCEDURE — 83690 ASSAY OF LIPASE: CPT

## 2024-01-04 PROCEDURE — G1004 CDSM NDSC: HCPCS

## 2024-01-04 PROCEDURE — 96361 HYDRATE IV INFUSION ADD-ON: CPT

## 2024-01-04 PROCEDURE — 81001 URINALYSIS AUTO W/SCOPE: CPT

## 2024-01-04 PROCEDURE — 80076 HEPATIC FUNCTION PANEL: CPT

## 2024-01-04 PROCEDURE — 80048 BASIC METABOLIC PNL TOTAL CA: CPT

## 2024-01-04 PROCEDURE — 36415 COLL VENOUS BLD VENIPUNCTURE: CPT

## 2024-01-04 PROCEDURE — 85025 COMPLETE CBC W/AUTO DIFF WBC: CPT

## 2024-01-04 PROCEDURE — 81025 URINE PREGNANCY TEST: CPT

## 2024-01-04 RX ORDER — KETOROLAC TROMETHAMINE 30 MG/ML
15 INJECTION, SOLUTION INTRAMUSCULAR; INTRAVENOUS ONCE
Status: COMPLETED | OUTPATIENT
Start: 2024-01-04 | End: 2024-01-04

## 2024-01-04 RX ORDER — METHOCARBAMOL 500 MG/1
500 TABLET, FILM COATED ORAL 2 TIMES DAILY
Qty: 20 TABLET | Refills: 0 | Status: SHIPPED | OUTPATIENT
Start: 2024-01-04

## 2024-01-04 RX ORDER — CIPROFLOXACIN HYDROCHLORIDE 3.5 MG/ML
1 SOLUTION/ DROPS TOPICAL
COMMUNITY
Start: 2024-01-02 | End: 2024-01-12

## 2024-01-04 RX ORDER — NAPROXEN 500 MG/1
500 TABLET ORAL 2 TIMES DAILY WITH MEALS
Qty: 30 TABLET | Refills: 0 | Status: SHIPPED | OUTPATIENT
Start: 2024-01-04

## 2024-01-04 RX ORDER — METHYLPREDNISOLONE 4 MG/1
TABLET ORAL
COMMUNITY
Start: 2024-01-02 | End: 2024-01-08

## 2024-01-04 RX ADMIN — IOHEXOL 85 ML: 350 INJECTION, SOLUTION INTRAVENOUS at 16:41

## 2024-01-04 RX ADMIN — KETOROLAC TROMETHAMINE 15 MG: 30 INJECTION, SOLUTION INTRAMUSCULAR; INTRAVENOUS at 15:58

## 2024-01-04 RX ADMIN — SODIUM CHLORIDE 1000 ML: 0.9 INJECTION, SOLUTION INTRAVENOUS at 15:55

## 2024-01-04 NOTE — Clinical Note
Kerry Celis was seen and treated in our emergency department on 1/4/2024.                Diagnosis:     Kerry  may return to work on return date.    She may return on this date: 01/05/2024         If you have any questions or concerns, please don't hesitate to call.      Zehra Amezquita PA-C    ______________________________           _______________          _______________  Hospital Representative                              Date                                Time

## 2024-01-04 NOTE — ED PROVIDER NOTES
"History  Chief Complaint   Patient presents with    Abdominal Pain     Rlq pain radiating into back x1 week. Patient reports pain radiating down right leg beginning yesterday. Hx of ovarian cyst.      Patient is a 38-year-old female with a history of ovarian cysts, chronic low back pain presenting for evaluation of right sided abdominal pain for 1.5 weeks.  Reports pain is a \"punching\" pain that is now radiating into her right flank into her right leg.  Pain has been increasing in frequency and severity.  She denies associated nausea, vomiting, diarrhea, constipation, dysuria, hematuria, abnormal vaginal discharge or bleeding.  No fevers, URI symptoms, chest pain, shortness of breath.  She will take ibuprofen and Tylenol occasionally without relief.  No prior abdominal surgeries.      Abdominal Pain  Associated symptoms: no chest pain, no chills, no constipation, no cough, no diarrhea, no dysuria, no fever, no hematuria, no nausea, no shortness of breath, no sore throat, no vaginal bleeding, no vaginal discharge and no vomiting        Prior to Admission Medications   Prescriptions Last Dose Informant Patient Reported? Taking?   DULoxetine (Cymbalta) 30 mg delayed release capsule  Self No No   Sig: Take 1 capsule (30 mg total) by mouth daily   Patient not taking: Reported on 11/18/2022   ciprofloxacin (CILOXAN) 0.3 % ophthalmic solution   Yes Yes   Sig: Apply 1 drop to eye   lidocaine (LIDODERM) 5 %   No No   Sig: Apply 1 patch topically over 12 hours every 24 hours Remove & Discard patch within 12 hours or as directed by MD   methocarbamol (ROBAXIN) 750 mg tablet   No No   Sig: Take 1 tablet (750 mg total) by mouth 3 (three) times a day as needed for muscle spasms   methylPREDNISolone (Medrol) 4 MG tablet therapy pack   Yes Yes   Sig: Take as directed per package instructions.   naproxen (NAPROSYN) 500 mg tablet   No No   Sig: Take 1 tablet (500 mg total) by mouth 2 (two) times a day as needed for mild pain or " moderate pain for up to 14 doses      Facility-Administered Medications: None       Past Medical History:   Diagnosis Date    Anxiety     Bulimia nervosa     per [employer]    Ovarian cyst     Right clavicle fracture        Past Surgical History:   Procedure Laterality Date    ELBOW SURGERY      FINGER SURGERY Right     4th finger       History reviewed. No pertinent family history.  I have reviewed and agree with the history as documented.    E-Cigarette/Vaping    E-Cigarette Use Never User      E-Cigarette/Vaping Substances    Nicotine No     THC No     CBD No     Flavoring No     Other No     Unknown No      Social History     Tobacco Use    Smoking status: Every Day     Current packs/day: 0.50     Types: Cigarettes    Smokeless tobacco: Never   Vaping Use    Vaping status: Never Used   Substance Use Topics    Alcohol use: Not Currently     Alcohol/week: 35.0 standard drinks of alcohol     Types: 35 Cans of beer per week    Drug use: Never       Review of Systems   Constitutional:  Negative for chills and fever.   HENT:  Negative for congestion, ear pain and sore throat.    Eyes:  Negative for pain and visual disturbance.   Respiratory:  Negative for cough and shortness of breath.    Cardiovascular:  Negative for chest pain and palpitations.   Gastrointestinal:  Negative for constipation, diarrhea, nausea and vomiting.   Genitourinary:  Positive for flank pain. Negative for dysuria, frequency, hematuria, vaginal bleeding and vaginal discharge.   Musculoskeletal:  Negative for arthralgias and back pain.   Skin:  Negative for color change and rash.   Neurological:  Negative for seizures and syncope.   All other systems reviewed and are negative.      Physical Exam  Physical Exam  Vitals and nursing note reviewed.   Constitutional:       General: She is not in acute distress.     Appearance: Normal appearance.   HENT:      Head: Normocephalic and atraumatic.      Right Ear: External ear normal.      Left Ear:  External ear normal.      Nose: Nose normal.      Mouth/Throat:      Mouth: Mucous membranes are moist.   Eyes:      General: No scleral icterus.        Right eye: No discharge.         Left eye: No discharge.      Extraocular Movements: Extraocular movements intact.   Cardiovascular:      Rate and Rhythm: Normal rate and regular rhythm.      Pulses: Normal pulses.      Heart sounds: Normal heart sounds.   Pulmonary:      Effort: Pulmonary effort is normal. No respiratory distress.      Breath sounds: Normal breath sounds.   Abdominal:      Palpations: Abdomen is soft.      Tenderness: There is abdominal tenderness in the right upper quadrant and right lower quadrant. There is right CVA tenderness. There is no left CVA tenderness or rebound.   Musculoskeletal:         General: No deformity or signs of injury.      Cervical back: Normal range of motion and neck supple.      Lumbar back: Tenderness present.      Comments: Mild tenderness to right lumbar paraspinal muscles. No midline spine tenderness, step offs, deformities. Strength, sensation equal and intact in bilateral lower extremities. DP and PT pulses 2+ bilaterally.     Skin:     General: Skin is dry.      Coloration: Skin is not jaundiced.      Findings: No erythema or rash.   Neurological:      General: No focal deficit present.      Mental Status: She is alert and oriented to person, place, and time. Mental status is at baseline.      Motor: No weakness.      Gait: Gait normal.   Psychiatric:         Mood and Affect: Mood normal.         Behavior: Behavior normal.         Thought Content: Thought content normal.         Vital Signs  ED Triage Vitals   Temperature Pulse Respirations Blood Pressure SpO2   01/04/24 1405 01/04/24 1407 01/04/24 1407 01/04/24 1407 01/04/24 1407   98.4 °F (36.9 °C) 87 18 115/78 99 %      Temp Source Heart Rate Source Patient Position - Orthostatic VS BP Location FiO2 (%)   01/04/24 1405 01/04/24 1407 01/04/24 1407 01/04/24 1407  --   Oral Monitor Sitting Right arm       Pain Score       01/04/24 1407       7           Vitals:    01/04/24 1407 01/04/24 1718   BP: 115/78 97/67   Pulse: 87 91   Patient Position - Orthostatic VS: Sitting          Visual Acuity      ED Medications  Medications   sodium chloride 0.9 % bolus 1,000 mL (0 mL Intravenous Stopped 1/4/24 1650)   ketorolac (TORADOL) injection 15 mg (15 mg Intravenous Given 1/4/24 1558)   iohexol (OMNIPAQUE) 350 MG/ML injection (SINGLE-DOSE) 85 mL (85 mL Intravenous Given 1/4/24 1641)       Diagnostic Studies  Results Reviewed       Procedure Component Value Units Date/Time    Urine Microscopic [[id number]]  (Normal) Collected: 01/04/24 1555    Lab Status: Final result Specimen: Urine, Clean Catch Updated: 01/04/24 1650     RBC, UA None Seen /hpf      WBC, UA 1-2 /hpf      Epithelial Cells Occasional /hpf      Bacteria, UA Occasional /hpf     Hepatic function panel [[id number]]  (Normal) Collected: 01/04/24 1553    Lab Status: Final result Specimen: Blood from Arm, Left Updated: 01/04/24 1626     Total Bilirubin 0.27 mg/dL      Bilirubin, Direct 0.05 mg/dL      Alkaline Phosphatase 38 U/L      AST 14 U/L      ALT 10 U/L      Total Protein 6.6 g/dL      Albumin 4.1 g/dL     Basic metabolic panel [[id number]] Collected: 01/04/24 1553    Lab Status: Final result Specimen: Blood from Arm, Left Updated: 01/04/24 1626     Sodium 137 mmol/L      Potassium 3.6 mmol/L      Chloride 106 mmol/L      CO2 25 mmol/L      ANION GAP 6 mmol/L      BUN 6 mg/dL      Creatinine 0.70 mg/dL      Glucose 105 mg/dL      Calcium 8.9 mg/dL      eGFR 110 ml/min/1.73sq m     Narrative:      National Kid[employer] Foundation guidelines for Chronic Kidney Disease (CKD):     Stage 1 with normal or high GFR (GFR > 90 mL/min/1.73 square meters)    Stage 2 Mild CKD (GFR = 60-89 mL/min/1.73 square meters)    Stage 3A Moderate CKD (GFR = 45-59 mL/min/1.73 square meters)    Stage 3B Moderate CKD (GFR = 30-44 mL/min/1.73  square meters)    Stage 4 Severe CKD (GFR = 15-29 mL/min/1.73 square meters)    Stage 5 End Stage CKD (GFR <15 mL/min/1.73 square meters)  Note: GFR calculation is accurate only with a steady state creatinine    Lipase [[id number]]  (Normal) Collected: 01/04/24 1553    Lab Status: Final result Specimen: Blood from Arm, Left Updated: 01/04/24 1626     Lipase 19 u/L     CBC and differential [[id number]]  (Abnormal) Collected: 01/04/24 1553    Lab Status: Final result Specimen: Blood from Arm, Left Updated: 01/04/24 1608     WBC 8.79 Thousand/uL      RBC 3.23 Million/uL      Hemoglobin 11.0 g/dL      Hematocrit 34.0 %       fL      MCH 34.1 pg      MCHC 32.4 g/dL      RDW 12.7 %      MPV 9.9 fL      Platelets 204 Thousands/uL      nRBC 0 /100 WBCs      Neutrophils Relative 71 %      Immat GRANS % 0 %      Lymphocytes Relative 20 %      Monocytes Relative 6 %      Eosinophils Relative 3 %      Basophils Relative 0 %      Neutrophils Absolute 6.21 Thousands/µL      Immature Grans Absolute 0.03 Thousand/uL      Lymphocytes Absolute 1.79 Thousands/µL      Monocytes Absolute 0.49 Thousand/µL      Eosinophils Absolute 0.24 Thousand/µL      Basophils Absolute 0.03 Thousands/µL     POCT pregnancy, urine [[id number]]  (Normal) Resulted: 01/04/24 1558    Lab Status: Final result Updated: 01/04/24 1558     EXT Preg Test, Ur Negative     Control Valid    Urine Macroscopic, POC [[id number]]  (Abnormal) Collected: 01/04/24 1555    Lab Status: Final result Specimen: Urine Updated: 01/04/24 1556     Color, UA Yellow     Clarity, UA Clear     pH, UA 5.5     Leukocytes, UA Trace     Nitrite, UA Negative     Protein, UA Negative mg/dl      Glucose, UA Negative mg/dl      Ketones, UA Negative mg/dl      Urobilinogen, UA 0.2 E.U./dl      Bilirubin, UA Negative     Occult Blood, UA Trace     Specific Gravity, UA 1.015    Narrative:      CLINITEK RESULT                   CT abdomen pelvis with contrast   Final Result by Serjio Payton MD  (01/04 1755)      No acute inflammatory process identified.            Workstation performed: [id number]                    Procedures  Procedures         ED Course  ED Course as of 01/05/24 0037   Thu Jan 04, 2024   1603 PREGNANCY TEST URINE: Negative   1603 Urine Macroscopic, POC(!)  Trace leukocytes and blood. Negative nitrites.   1615 CBC and differential(!)  No leukocytosis. Hgb around baseline for patient.   1700 Updated patient on labs. Still having pain with movement. CT read pending.                               SBIRT 22yo+      Flowsheet Row Most Recent Value   Initial Alcohol Screen: US AUDIT-C     1. How often do you have a drink containing alcohol? 0 Filed at: 01/04/2024 1637   2. How many drinks containing alcohol do you have on a typical day you are drinking?  0 Filed at: 01/04/2024 1637   3b. FEMALE Any Age, or MALE 65+: How often do you have 4 or more drinks on one occassion? 0 Filed at: 01/04/2024 1637   Audit-C Score 0 Filed at: 01/04/2024 1637   SUNNY: How many times in the past year have you...    Used an illegal drug or used a prescription medication for non-medical reasons? Never Filed at: 01/04/2024 1637                      Medical Decision Making  Patient is a 38-year-old female presenting for evaluation of right-sided abdominal pain radiating to right flank and right leg for 1.5 weeks.  Vitals within normal limits on arrival to the ED.  Physical exam remarkable for tenderness to the right side of the abdomen without rebound or guarding.  She also has CVA tenderness and mild right lumbar paraspinal muscle tenderness.  DDx including but not limited to: Appendicitis, cholelithiasis, cholecystitis, UTI, pyelonephritis, nephrolithiasis, diverticulitis, colitis, pancreatitis, ovarian pathology. Plan:  CBC, BMP, lipase, hepatic function panel, UA, urine pregnancy, CT ab pelvis with contrast.  Will treat symptomatically with Toradol.    Labs are negative for leukocytosis, electrolyte  abnormalities, RADHA.  Hemoglobin mildly decreased at 11 but is at patient's baseline.  Hepatic function panel and lipase normal.  UA has trace leukocytes and blood but no nitrates and only occasional bacteria.  Urine pregnancy negative.  CT is negative for acute intra-abdominal pathology.  Patient can be discharged home with supportive care and outpatient follow-up.  Prescribed Robaxin and naprosyn for possible back pain as contributing to patient's symptoms.  Instructed to follow-up with PCP if symptoms persist.    I have discussed findings and plan for discharge with the patient/caregiver. Follow up with the appropriate providers including primary care physician was discussed. Return precautions discussed with patient/caregiver as outlined in AVS. Patient/caregiver verbally expressed understanding. Patient stable at time of discharge and ambulated out of the emergency department.       Amount and/or Complexity of Data Reviewed  Labs: ordered. Decision-making details documented in ED Course.  Radiology: ordered.    Risk  Prescription drug management.             Disposition  Final diagnoses:   Right sided abdominal pain   Right low back pain     Time reflects when diagnosis was documented in both MDM as applicable and the Disposition within this note       Time User Action Codes Description Comment    1/4/2024  6:23 PM Zehra Amezquita Add [R10.9] Right sided abdominal pain     1/4/2024  6:23 PM Zehra Amezquita Add [M54.50] Right low back pain           ED Disposition       ED Disposition   Discharge    Condition   Stable    Date/Time   Thu Jan 4, 2024 1823    Comment   Kerry Celis discharge to home/self care.                   Follow-up Information       Follow up With Specialties Details Why Contact Info    Mariano Lowery Jr., MD Family Medicine   [address] [place] [address]  [address]  [facility] [address] [phone]              Discharge Medication List as of 1/4/2024  6:30 PM        START taking these  medications    Details   !! methocarbamol (ROBAXIN) 500 mg tablet Take 1 tablet (500 mg total) by mouth 2 (two) times a day, Starting u 1/4/2024, Normal      !! naproxen (Naprosyn) 500 mg tablet Take 1 tablet (500 mg total) by mouth 2 (two) times a day with meals, Starting Thu 1/4/2024, Normal       !! - Potential duplicate medications found. Please discuss with provider.        CONTINUE these medications which have NOT CHANGED    Details   ciprofloxacin (CILOXAN) 0.3 % ophthalmic solution Apply 1 drop to eye, Starting Tue 1/2/2024, Until Fri 1/12/2024 at 2359, Historical Med      methylPREDNISolone (Medrol) 4 MG tablet therapy pack Take as directed per package instructions., Historical Med      DULoxetine (Cymbalta) 30 mg delayed release capsule Take 1 capsule (30 mg total) by mouth daily, Starting Fri 10/7/2022, Normal      lidocaine (LIDODERM) 5 % Apply 1 patch topically over 12 hours every 24 hours Remove & Discard patch within 12 hours or as directed by MD, Starting u 7/20/2023, Normal      !! methocarbamol (ROBAXIN) 750 mg tablet Take 1 tablet (750 mg total) by mouth 3 (three) times a day as needed for muscle spasms, Starting Thu 7/20/2023, Normal      !! naproxen (NAPROSYN) 500 mg tablet Take 1 tablet (500 mg total) by mouth 2 (two) times a day as needed for mild pain or moderate pain for up to 14 doses, Starting Thu 7/20/2023, Normal       !! - Potential duplicate medications found. Please discuss with provider.              PDMP Review       None            ED Provider  Electronically Signed by             Zehra Amezquita PA-C  01/05/24 0037

## 2024-01-08 ENCOUNTER — TELEPHONE (OUTPATIENT)
Dept: PHYSICAL THERAPY | Facility: OTHER | Age: 39
End: 2024-01-08

## 2024-01-08 NOTE — TELEPHONE ENCOUNTER
Call placed to the patient per [facility] referral.    Unable to LM mailbox was full. Patient did not answer at the time the call was placed      This is the 1st attempt to reach the patient.  Will defer per protocol.

## 2024-01-12 NOTE — TELEPHONE ENCOUNTER
Call placed to the patient per [facility] referral.    Unable to LM, the mailbox was full    This the 2nd attempt to reach the patient.   Will defer per protocol.

## 2024-01-19 NOTE — TELEPHONE ENCOUNTER
Call placed to the patient per [facility] referral.    Unable to LM mailbox was full. Phone did ring, patient did not answer    This is the 3rd attempt to reach the patient.   Referral closed.

## 2024-05-06 ENCOUNTER — OFFICE VISIT (OUTPATIENT)
Dept: FAMILY MEDICINE CLINIC | Facility: CLINIC | Age: 39
End: 2024-05-06
Payer: COMMERCIAL

## 2024-05-06 ENCOUNTER — TELEPHONE (OUTPATIENT)
Dept: GASTROENTEROLOGY | Facility: MEDICAL CENTER | Age: 39
End: 2024-05-06

## 2024-05-06 ENCOUNTER — PREP FOR PROCEDURE (OUTPATIENT)
Dept: GASTROENTEROLOGY | Facility: MEDICAL CENTER | Age: 39
End: 2024-05-06

## 2024-05-06 VITALS
OXYGEN SATURATION: 99 % | TEMPERATURE: 96.5 F | SYSTOLIC BLOOD PRESSURE: 118 MMHG | BODY MASS INDEX: 18.78 KG/M2 | WEIGHT: 110 LBS | HEART RATE: 113 BPM | HEIGHT: 64 IN | RESPIRATION RATE: 20 BRPM | DIASTOLIC BLOOD PRESSURE: 62 MMHG

## 2024-05-06 DIAGNOSIS — R10.13 EPIGASTRIC PAIN: Primary | ICD-10-CM

## 2024-05-06 PROCEDURE — 99214 OFFICE O/P EST MOD 30 MIN: CPT | Performed by: INTERNAL MEDICINE

## 2024-05-06 RX ORDER — OMEPRAZOLE 40 MG/1
40 CAPSULE, DELAYED RELEASE ORAL DAILY
Qty: 60 CAPSULE | Refills: 0 | Status: SHIPPED | OUTPATIENT
Start: 2024-05-06 | End: 2024-05-07

## 2024-05-06 RX ORDER — SODIUM CHLORIDE, SODIUM LACTATE, POTASSIUM CHLORIDE, CALCIUM CHLORIDE 600; 310; 30; 20 MG/100ML; MG/100ML; MG/100ML; MG/100ML
125 INJECTION, SOLUTION INTRAVENOUS CONTINUOUS
Status: CANCELLED | OUTPATIENT
Start: 2024-05-06

## 2024-05-06 RX ORDER — SUCRALFATE 1 G/1
1 TABLET ORAL
Qty: 60 TABLET | Refills: 0 | Status: SHIPPED | OUTPATIENT
Start: 2024-05-06

## 2024-05-06 NOTE — TELEPHONE ENCOUNTER
Called patient to schedule urgent EGD; offered 5/7 and 5/9; patient was not agreeable to those dates.      Scheduled date of EGD (as of today) 5/14   Physician performing: Dr. Fraser  Location of procedure:    Instructions given to patient: EGD  Clearances: N/A

## 2024-05-06 NOTE — TELEPHONE ENCOUNTER
Noted. If she doesn't want the EGD this week, could she be scheduled for an urgent GI appointment this week before the procedure? That would be ideal, but I was trying to accommodate her because I thought she wanted the procedure this week.

## 2024-05-06 NOTE — PROGRESS NOTES
Assessment/Plan:    Epigastric pain  Patient reports significant epigastric abdominal pain, cannot exclude blackish discoloration of her stool.  She does not use NSAIDs but drinks 3 beers a day.  She reports vomiting, did not report any blood in her vomit.  Will continue with BRAT diet, start omeprazole 40 mg twice daily and Carafate before each meal.  Avoid tomatoes, coffee, chocolate, spicy food and alcohol.  Will try to get her earlier appointment with gastroenterology for possible upper endoscopy.  Discussed with the patient if any worsening of her symptoms, more episodes of black stool or blood in her vomit she needs to go to emergency room immediately.       Diagnoses and all orders for this visit:    Epigastric pain  -     omeprazole (PriLOSEC) 40 MG capsule; Take 1 capsule (40 mg total) by mouth daily  -     Ambulatory Referral to Gastroenterology; Future  -     CBC and differential; Future  -     Comprehensive metabolic panel; Future  -     Lipase; Future  -     sucralfate (CARAFATE) 1 g tablet; Take 1 tablet (1 g total) by mouth 4 (four) times a day (before meals and at bedtime)          Subjective:      Patient ID: Kerry Celis is a 38 y.o. female.    Patient came today with complains of multiple episodes of diarrhea this started on Saturday associated with epigastric abdominal pain.  She also reported that she cannot exclude that she had few episodes of blackish stool which is currently normal in color but very watery.  She denies any chills or fevers.  She did not eat out recently, did not travel.    Abdominal Pain  Associated symptoms include diarrhea, nausea and vomiting. Pertinent negatives include no fever.       The following portions of the patient's history were reviewed and updated as appropriate: allergies, current medications, past family history, past medical history, past social history, past surgical history, and problem list.    Review of Systems   Constitutional:  Negative for  "chills and fever.   Gastrointestinal:  Positive for abdominal pain, diarrhea, nausea and vomiting. Negative for abdominal distention, anal bleeding and blood in stool.         Objective:      /62 (BP Location: Left arm, Patient Position: Sitting, Cuff Size: Standard)   Pulse (!) 113   Temp (!) 96.5 °F (35.8 °C) (Tympanic)   Resp 20   Ht 5' 4\" (1.626 m)   Wt 49.9 kg (110 lb)   LMP 05/06/2024 (Approximate) Comment: Just started  SpO2 99%   BMI 18.88 kg/m²     Allergies   Allergen Reactions    Codeine Anaphylaxis          Current Outpatient Medications:     omeprazole (PriLOSEC) 40 MG capsule, Take 1 capsule (40 mg total) by mouth daily, Disp: 60 capsule, Rfl: 0    sucralfate (CARAFATE) 1 g tablet, Take 1 tablet (1 g total) by mouth 4 (four) times a day (before meals and at bedtime), Disp: 60 tablet, Rfl: 0     There are no Patient Instructions on file for this visit.        Physical Exam  Constitutional:       General: She is not in acute distress.     Appearance: She is not toxic-appearing.   Cardiovascular:      Heart sounds: No murmur heard.     No gallop.   Pulmonary:      Effort: No respiratory distress.      Breath sounds: No wheezing or rales.   Abdominal:      General: There is no distension.      Palpations: There is no mass.      Tenderness: There is abdominal tenderness (tenderness). There is no guarding or rebound.      Hernia: No hernia is present.         "

## 2024-05-06 NOTE — TELEPHONE ENCOUNTER
Offered patient an office visit before procedure per Dr. Marsh's request; patient unable to make appointment times offered.  Patient asked if earlier EGD appt is still available; will check with GI lab and return call to patient for possible reschedule.

## 2024-05-06 NOTE — ASSESSMENT & PLAN NOTE
Patient reports significant epigastric abdominal pain, cannot exclude blackish discoloration of her stool.  She does not use NSAIDs but drinks 3 beers a day.  She reports vomiting, did not report any blood in her vomit.  Will continue with BRAT diet, start omeprazole 40 mg twice daily and Carafate before each meal.  Avoid tomatoes, coffee, chocolate, spicy food and alcohol.  Will try to get her earlier appointment with gastroenterology for possible upper endoscopy.  Discussed with the patient if any worsening of her symptoms, more episodes of black stool or blood in her vomit she needs to go to emergency room immediately.

## 2024-05-06 NOTE — TELEPHONE ENCOUNTER
Dr. Ferreira (PCP) reached out to me after his visit with her today, requesting urgent EGD this week for a high suspicion for gastric or duodenal ulcers in this patient.     Reportedly having severe epigastric pain, not clear if she has had dark stools. CBC, CMP, lipase pending.    Will try to get patient scheduled for EGD this week. If she develops worsening pain or GI bleeding (melena/hematochezia), she should go the ER.

## 2024-05-07 ENCOUNTER — HOSPITAL ENCOUNTER (OUTPATIENT)
Dept: GASTROENTEROLOGY | Facility: HOSPITAL | Age: 39
Setting detail: OUTPATIENT SURGERY
Discharge: HOME | End: 2024-05-07
Attending: STUDENT IN AN ORGANIZED HEALTH CARE EDUCATION/TRAINING PROGRAM
Payer: COMMERCIAL

## 2024-05-07 ENCOUNTER — ANESTHESIA (OUTPATIENT)
Dept: GASTROENTEROLOGY | Facility: HOSPITAL | Age: 39
End: 2024-05-07

## 2024-05-07 ENCOUNTER — ANESTHESIA EVENT (OUTPATIENT)
Dept: GASTROENTEROLOGY | Facility: HOSPITAL | Age: 39
End: 2024-05-07

## 2024-05-07 VITALS
HEART RATE: 78 BPM | TEMPERATURE: 98 F | DIASTOLIC BLOOD PRESSURE: 78 MMHG | SYSTOLIC BLOOD PRESSURE: 116 MMHG | OXYGEN SATURATION: 100 % | RESPIRATION RATE: 12 BRPM

## 2024-05-07 DIAGNOSIS — R10.13 EPIGASTRIC PAIN: ICD-10-CM

## 2024-05-07 LAB
EXT PREGNANCY TEST URINE: NEGATIVE
EXT. CONTROL: NORMAL

## 2024-05-07 PROCEDURE — 88305 TISSUE EXAM BY PATHOLOGIST: CPT | Performed by: PATHOLOGY

## 2024-05-07 PROCEDURE — 81025 URINE PREGNANCY TEST: CPT | Performed by: ANESTHESIOLOGY

## 2024-05-07 PROCEDURE — 43239 EGD BIOPSY SINGLE/MULTIPLE: CPT | Performed by: INTERNAL MEDICINE

## 2024-05-07 RX ORDER — SODIUM CHLORIDE, SODIUM LACTATE, POTASSIUM CHLORIDE, CALCIUM CHLORIDE 600; 310; 30; 20 MG/100ML; MG/100ML; MG/100ML; MG/100ML
INJECTION, SOLUTION INTRAVENOUS CONTINUOUS PRN
Status: DISCONTINUED | OUTPATIENT
Start: 2024-05-07 | End: 2024-05-07

## 2024-05-07 RX ORDER — LIDOCAINE HYDROCHLORIDE 10 MG/ML
INJECTION, SOLUTION EPIDURAL; INFILTRATION; INTRACAUDAL; PERINEURAL AS NEEDED
Status: DISCONTINUED | OUTPATIENT
Start: 2024-05-07 | End: 2024-05-07

## 2024-05-07 RX ORDER — PROPOFOL 10 MG/ML
INJECTION, EMULSION INTRAVENOUS AS NEEDED
Status: DISCONTINUED | OUTPATIENT
Start: 2024-05-07 | End: 2024-05-07

## 2024-05-07 RX ORDER — SODIUM CHLORIDE, SODIUM LACTATE, POTASSIUM CHLORIDE, CALCIUM CHLORIDE 600; 310; 30; 20 MG/100ML; MG/100ML; MG/100ML; MG/100ML
125 INJECTION, SOLUTION INTRAVENOUS CONTINUOUS
Status: DISCONTINUED | OUTPATIENT
Start: 2024-05-07 | End: 2024-05-11 | Stop reason: HOSPADM

## 2024-05-07 RX ORDER — OMEPRAZOLE 40 MG/1
40 CAPSULE, DELAYED RELEASE ORAL DAILY
Qty: 30 CAPSULE | Refills: 5 | Status: SHIPPED | OUTPATIENT
Start: 2024-05-07 | End: 2024-11-03

## 2024-05-07 RX ADMIN — SODIUM CHLORIDE, SODIUM LACTATE, POTASSIUM CHLORIDE, AND CALCIUM CHLORIDE: .6; .31; .03; .02 INJECTION, SOLUTION INTRAVENOUS at 11:50

## 2024-05-07 RX ADMIN — PROPOFOL 80 MG: 10 INJECTION, EMULSION INTRAVENOUS at 11:59

## 2024-05-07 RX ADMIN — PROPOFOL 150 MG: 10 INJECTION, EMULSION INTRAVENOUS at 11:54

## 2024-05-07 RX ADMIN — LIDOCAINE HYDROCHLORIDE 50 MG: 10 INJECTION, SOLUTION EPIDURAL; INFILTRATION; INTRACAUDAL; PERINEURAL at 11:54

## 2024-05-07 RX ADMIN — PROPOFOL 50 MG: 10 INJECTION, EMULSION INTRAVENOUS at 11:56

## 2024-05-07 RX ADMIN — SODIUM CHLORIDE, SODIUM LACTATE, POTASSIUM CHLORIDE, AND CALCIUM CHLORIDE 125 ML/HR: .6; .31; .03; .02 INJECTION, SOLUTION INTRAVENOUS at 11:27

## 2024-05-07 NOTE — ANESTHESIA PREPROCEDURE EVALUATION
Procedure:  EGD    Relevant Problems   CARDIO   (+) Other chest pain      MUSCULOSKELETAL   (+) Chronic bilateral low back pain without sciatica      NEURO/PSYCH   (+) Anxiety and depression   (+) Chronic bilateral low back pain without sciatica   (+) Depression, recurrent (HCC)   (+) Paresthesias      PULMONARY   (+) Gasping for breath      Behavioral Health   (+) Tobacco abuse      Obstetrics/Gynecology   (+) Complete miscarriage      Neurology/Sleep   (+) Lyme neuropathy      Eye   (+) Vision changes      Surgery/Wound/Pain   (+) Epigastric pain      Orthopedic/Musculoskeletal   (+) Left arm pain   (+) Left elbow pain      Other   (+) Abnormal ECG   (+) Abnormal finding on cardiovascular stress test   (+) Fatigue   (+) Heart palpitations   (+) Lyme disease        Physical Exam    Airway    Mallampati score: II  TM Distance: >3 FB  Neck ROM: full     Dental        Cardiovascular  Cardiovascular exam normal    Pulmonary  Pulmonary exam normal     Other Findings  post-pubertal.      Anesthesia Plan  ASA Score- 2     Anesthesia Type- IV sedation with anesthesia with ASA Monitors.         Additional Monitors:     Airway Plan:            Plan Factors-Exercise tolerance (METS): >4 METS.    Chart reviewed. EKG reviewed.  Existing labs reviewed.     Patient is a current smoker.  Patient instructed to abstain from smoking on day of procedure. Patient did not smoke on day of surgery.            Induction- intravenous.    Postoperative Plan-     Informed Consent- Anesthetic plan and risks discussed with patient.  I personally reviewed this patient with the CRNA. Discussed and agreed on the Anesthesia Plan with the CRNA..

## 2024-05-07 NOTE — ANESTHESIA POSTPROCEDURE EVALUATION
Post-Op Assessment Note    CV Status:  Stable    Pain management: adequate       Mental Status:  Alert and awake   Hydration Status:  Euvolemic   PONV Controlled:  Controlled   Airway Patency:  Patent     Post Op Vitals Reviewed: Yes    No anethesia notable event occurred.    Staff: MING               /68 (05/07/24 1205)    Temp 98 °F (36.7 °C) (05/07/24 1205)    Pulse 99 (05/07/24 1205)   Resp 12 (05/07/24 1205)    SpO2 100 % (05/07/24 1205)

## 2024-05-07 NOTE — H&P
History and Physical - [facility]  Kerry Celis 38 y.o. female MRN: [id number]                  HPI: Kerry Celis is a 38 y.o. year old female who presents for investigate abdominal pain.      REVIEW OF SYSTEMS: Per the HPI, and otherwise unremarkable.    Historical Information   Past Medical History:   Diagnosis Date    Anxiety     Bulimia nervosa     per [employer]    Ovarian cyst     Right clavicle fracture      Past Surgical History:   Procedure Laterality Date    ELBOW SURGERY      FINGER SURGERY Right     4th finger     Social History   Social History     Substance and Sexual Activity   Alcohol Use Yes    Alcohol/week: 35.0 standard drinks of alcohol    Types: 35 Cans of beer per week     Social History     Substance and Sexual Activity   Drug Use Never     Social History     Tobacco Use   Smoking Status Every Day    Current packs/day: 0.50    Types: Cigarettes   Smokeless Tobacco Never     History reviewed. No pertinent family history.    Meds/Allergies     (Not in a hospital admission)      Allergies   Allergen Reactions    Codeine Anaphylaxis       Objective     Last menstrual period 05/06/2024, unknown if currently breastfeeding.      PHYSICAL EXAM    Gen: NAD  CV: RRR  CHEST: Clear  ABD: soft, NT/ND  EXT: no edema      ASSESSMENT/PLAN:  Kerry Celis is a 38 y.o. year old female who presents for investigate abdominal pain. The patient is stable and optimized for the procedure, we reviewed risk and benefits. Risk include but not limited to infection, bleeding, perforation and missing a lesion.

## 2024-05-13 PROCEDURE — 88305 TISSUE EXAM BY PATHOLOGIST: CPT | Performed by: PATHOLOGY

## 2024-08-29 DIAGNOSIS — R10.13 EPIGASTRIC PAIN: ICD-10-CM

## 2024-08-29 RX ORDER — OMEPRAZOLE 40 MG/1
40 CAPSULE, DELAYED RELEASE ORAL DAILY
Qty: 30 CAPSULE | Refills: 5 | Status: SHIPPED | OUTPATIENT
Start: 2024-08-29 | End: 2025-02-25